# Patient Record
Sex: FEMALE | Race: BLACK OR AFRICAN AMERICAN | Employment: FULL TIME | URBAN - METROPOLITAN AREA
[De-identification: names, ages, dates, MRNs, and addresses within clinical notes are randomized per-mention and may not be internally consistent; named-entity substitution may affect disease eponyms.]

---
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
## 2020-01-14 ENCOUNTER — HOSPITAL ENCOUNTER (EMERGENCY)
Age: 20
Discharge: HOME | End: 2020-01-14
Attending: EMERGENCY MEDICINE
Payer: SELF-PAY

## 2020-01-14 VITALS
HEIGHT: 64 IN | HEART RATE: 63 BPM | TEMPERATURE: 98.2 F | DIASTOLIC BLOOD PRESSURE: 55 MMHG | SYSTOLIC BLOOD PRESSURE: 113 MMHG | RESPIRATION RATE: 14 BRPM | WEIGHT: 203 LBS | OXYGEN SATURATION: 99 % | BODY MASS INDEX: 34.66 KG/M2

## 2020-01-14 DIAGNOSIS — R11.2 NON-INTRACTABLE VOMITING WITH NAUSEA, UNSPECIFIED VOMITING TYPE: Primary | ICD-10-CM

## 2020-01-14 LAB
ALBUMIN SERPL-MCNC: 3.9 G/DL (ref 3.5–5)
ALBUMIN/GLOB SERPL: 0.9 {RATIO} (ref 1.2–3.5)
ALP SERPL-CCNC: 92 U/L (ref 50–136)
ALT SERPL-CCNC: 23 U/L (ref 12–65)
ANION GAP SERPL CALC-SCNC: 8 MMOL/L (ref 7–16)
AST SERPL-CCNC: 17 U/L (ref 15–37)
BASOPHILS # BLD: 0 K/UL (ref 0–0.2)
BASOPHILS NFR BLD: 0 % (ref 0–2)
BILIRUB SERPL-MCNC: 0.4 MG/DL (ref 0.2–1.1)
BUN SERPL-MCNC: 13 MG/DL (ref 6–23)
CALCIUM SERPL-MCNC: 9.4 MG/DL (ref 8.3–10.4)
CHLORIDE SERPL-SCNC: 104 MMOL/L (ref 98–107)
CO2 SERPL-SCNC: 26 MMOL/L (ref 21–32)
CREAT SERPL-MCNC: 0.66 MG/DL (ref 0.6–1)
DIFFERENTIAL METHOD BLD: ABNORMAL
EOSINOPHIL # BLD: 0.1 K/UL (ref 0–0.8)
EOSINOPHIL NFR BLD: 1 % (ref 0.5–7.8)
ERYTHROCYTE [DISTWIDTH] IN BLOOD BY AUTOMATED COUNT: 12.7 % (ref 11.9–14.6)
GLOBULIN SER CALC-MCNC: 4.2 G/DL (ref 2.3–3.5)
GLUCOSE SERPL-MCNC: 91 MG/DL (ref 65–100)
HCG UR QL: NEGATIVE
HCT VFR BLD AUTO: 40.8 % (ref 35.8–46.3)
HGB BLD-MCNC: 13.8 G/DL (ref 11.7–15.4)
IMM GRANULOCYTES # BLD AUTO: 0 K/UL (ref 0–0.5)
IMM GRANULOCYTES NFR BLD AUTO: 0 % (ref 0–5)
LYMPHOCYTES # BLD: 1.3 K/UL (ref 0.5–4.6)
LYMPHOCYTES NFR BLD: 13 % (ref 13–44)
MCH RBC QN AUTO: 29.2 PG (ref 26.1–32.9)
MCHC RBC AUTO-ENTMCNC: 33.8 G/DL (ref 31.4–35)
MCV RBC AUTO: 86.4 FL (ref 79.6–97.8)
MONOCYTES # BLD: 0.6 K/UL (ref 0.1–1.3)
MONOCYTES NFR BLD: 6 % (ref 4–12)
NEUTS SEG # BLD: 8.1 K/UL (ref 1.7–8.2)
NEUTS SEG NFR BLD: 80 % (ref 43–78)
NRBC # BLD: 0 K/UL (ref 0–0.2)
PLATELET # BLD AUTO: 184 K/UL (ref 150–450)
PMV BLD AUTO: 12.1 FL (ref 9.4–12.3)
POTASSIUM SERPL-SCNC: 3.9 MMOL/L (ref 3.5–5.1)
PROT SERPL-MCNC: 8.1 G/DL (ref 6.3–8.2)
RBC # BLD AUTO: 4.72 M/UL (ref 4.05–5.2)
SODIUM SERPL-SCNC: 138 MMOL/L (ref 136–145)
WBC # BLD AUTO: 10.1 K/UL (ref 4.3–11.1)

## 2020-01-14 PROCEDURE — 74011250636 HC RX REV CODE- 250/636: Performed by: EMERGENCY MEDICINE

## 2020-01-14 PROCEDURE — 81003 URINALYSIS AUTO W/O SCOPE: CPT | Performed by: EMERGENCY MEDICINE

## 2020-01-14 PROCEDURE — 80053 COMPREHEN METABOLIC PANEL: CPT

## 2020-01-14 PROCEDURE — 81025 URINE PREGNANCY TEST: CPT

## 2020-01-14 PROCEDURE — 99284 EMERGENCY DEPT VISIT MOD MDM: CPT | Performed by: EMERGENCY MEDICINE

## 2020-01-14 PROCEDURE — 96374 THER/PROPH/DIAG INJ IV PUSH: CPT | Performed by: EMERGENCY MEDICINE

## 2020-01-14 PROCEDURE — 96361 HYDRATE IV INFUSION ADD-ON: CPT | Performed by: EMERGENCY MEDICINE

## 2020-01-14 PROCEDURE — 85025 COMPLETE CBC W/AUTO DIFF WBC: CPT

## 2020-01-14 RX ORDER — ONDANSETRON 2 MG/ML
4 INJECTION INTRAMUSCULAR; INTRAVENOUS
Status: COMPLETED | OUTPATIENT
Start: 2020-01-14 | End: 2020-01-14

## 2020-01-14 RX ORDER — ONDANSETRON 4 MG/1
4 TABLET, ORALLY DISINTEGRATING ORAL
Qty: 11 TAB | Refills: 1 | Status: SHIPPED | OUTPATIENT
Start: 2020-01-14 | End: 2020-07-27

## 2020-01-14 RX ADMIN — ONDANSETRON 4 MG: 2 INJECTION INTRAMUSCULAR; INTRAVENOUS at 12:35

## 2020-01-14 RX ADMIN — SODIUM CHLORIDE 1000 ML: 900 INJECTION, SOLUTION INTRAVENOUS at 12:35

## 2020-01-14 NOTE — ED NOTES
I have reviewed discharge instructions with the patient. The patient verbalized understanding. Patient left ED via Discharge Method: ambulatory to Home. Opportunity for questions and clarification provided. Patient given 1 scripts. To continue your aftercare when you leave the hospital, you may receive an automated call from our care team to check in on how you are doing. This is a free service and part of our promise to provide the best care and service to meet your aftercare needs.  If you have questions, or wish to unsubscribe from this service please call [phone]. Thank you for Choosing our [facility] Emergency Department.

## 2020-01-14 NOTE — ED TRIAGE NOTES
Pt to ED c/o irregular menstrual cycle. Pt hadn't had a period in 5 months and just started spotting yesterday. States she then became very nauseated and started vomiting. Denies menstrual cramps but states abdomen is sore from vomiting. No fever.

## 2020-01-14 NOTE — ED PROVIDER NOTES
Patient is a 45-year-old female with a history of irregular menstrual cycles who comes to the ER today complaining of abdominal pain and vaginal spotting which started yesterday. She also had some nausea. This morning she started vomiting. No fever. No diarrhea. Not had a cycle in 5 months but this is not abnormal for her. She denies any urinary symptoms. She denies any vaginal discharge. The history is provided by the patient. Menstrual Problem   Primary symptoms include vaginal bleeding. Primary symptoms include no discharge, no genital odor, no dysuria. There has been no fever. The current episode started yesterday. The problem has not changed since onset. Her LMP is unknown. Associated symptoms include abdominal pain, nausea and vomiting. Pertinent negatives include no diarrhea, no frequency, no flank pain and no fever. She has tried nothing for the symptoms. Vomiting    Associated symptoms include abdominal pain. Pertinent negatives include no chills, no fever, no diarrhea and no cough. Past Medical History:   Diagnosis Date    Asthma        No past surgical history on file. No family history on file.     Social History     Socioeconomic History    Marital status: SINGLE     Spouse name: Not on file    Number of children: Not on file    Years of education: Not on file    Highest education level: Not on file   Occupational History    Not on file   Social Needs    Financial resource strain: Not on file    Food insecurity:     Worry: Not on file     Inability: Not on file    Transportation needs:     Medical: Not on file     Non-medical: Not on file   Tobacco Use    Smoking status: Never Smoker    Smokeless tobacco: Never Used   Substance and Sexual Activity    Alcohol use: No    Drug use: No    Sexual activity: Not Currently   Lifestyle    Physical activity:     Days per week: Not on file     Minutes per session: Not on file    Stress: Not on file   Relationships    Social connections:     Talks on phone: Not on file     Gets together: Not on file     Attends Scientology service: Not on file     Active member of club or organization: Not on file     Attends meetings of clubs or organizations: Not on file     Relationship status: Not on file    Intimate partner violence:     Fear of current or ex partner: Not on file     Emotionally abused: Not on file     Physically abused: Not on file     Forced sexual activity: Not on file   Other Topics Concern    Not on file   Social History Narrative    Not on file         ALLERGIES: Patient has no known allergies. Review of Systems   Constitutional: Negative for chills, fatigue and fever. HENT: Negative for congestion, rhinorrhea and sore throat. Eyes: Negative for pain, discharge and visual disturbance. Respiratory: Negative for cough and shortness of breath. Cardiovascular: Negative for chest pain and palpitations. Gastrointestinal: Positive for abdominal pain, nausea and vomiting. Negative for diarrhea. Endocrine: Negative for polydipsia and polyuria. Genitourinary: Positive for menstrual problem and vaginal bleeding. Negative for dysuria, flank pain, frequency and urgency. Musculoskeletal: Negative for back pain and neck pain. Skin: Negative for rash. Neurological: Negative for seizures, syncope and weakness. Hematological: Negative. Vitals:    01/14/20 1039   BP: 119/67   Pulse: 67   Resp: 14   Temp: 98.2 °F (36.8 °C)   SpO2: 98%   Weight: 92.1 kg (203 lb)   Height: 5' 4\" (1.626 m)            Physical Exam  Vitals signs and nursing note reviewed. Constitutional:       Appearance: Normal appearance. She is well-developed. HENT:      Head: Normocephalic and atraumatic. Eyes:      Conjunctiva/sclera: Conjunctivae normal.      Pupils: Pupils are equal, round, and reactive to light. Neck:      Musculoskeletal: Normal range of motion and neck supple.    Cardiovascular:      Rate and Rhythm: Normal rate and regular rhythm. Pulmonary:      Effort: Pulmonary effort is normal.      Breath sounds: Normal breath sounds. Abdominal:      Palpations: Abdomen is soft. Tenderness: There is no tenderness. There is no guarding or rebound. Musculoskeletal: Normal range of motion. General: No deformity. Lymphadenopathy:      Cervical: No cervical adenopathy. Skin:     General: Skin is warm and dry. Findings: No rash. Neurological:      Mental Status: She is alert and oriented to person, place, and time. GCS: GCS eye subscore is 4. GCS verbal subscore is 5. GCS motor subscore is 6. Cranial Nerves: No cranial nerve deficit. Sensory: No sensory deficit. MDM  Number of Diagnoses or Management Options  Diagnosis management comments: Urinalysis is clean, hCG negative    Abdominal exam is benign. Will check basic screening labs. Give IV fluids. Zofran for the nausea. 1:04 PM  Laboratory evaluation normal    Clinically I think this is likely a viral illness causing the nausea and vomiting. I will prescribe some Zofran for home. She also needs a work note. Voice dictation software was used during the making of this note. This software is not perfect and grammatical and other typographical errors may be present. This note has been proofread, but may still contain errors.   Carlo Velez MD; 1/14/2020 @1:05 PM   ===================================================================         Amount and/or Complexity of Data Reviewed  Clinical lab tests: ordered and reviewed  Independent visualization of images, tracings, or specimens: yes    Risk of Complications, Morbidity, and/or Mortality  Presenting problems: low  Diagnostic procedures: low  Management options: low    Patient Progress  Patient progress: improved         Procedures

## 2020-01-14 NOTE — LETTER
[address] [address] EMERGENCY DEPT 
[address] 
[place] [address] 
[phone] Work/School Note Date: 1/14/2020 To Whom It May concern: 
 
Crystal Morin was seen and treated today in the emergency room by the following provider(s): 
Attending Provider: Judith Husain, [facility] {Return to school/sport/work:1/15/2020 Sincerely, Karla Rojas MD

## 2020-07-27 ENCOUNTER — HOSPITAL ENCOUNTER (EMERGENCY)
Age: 20
Discharge: HOME | End: 2020-07-27
Attending: EMERGENCY MEDICINE

## 2020-07-27 ENCOUNTER — APPOINTMENT (OUTPATIENT)
Dept: GENERAL RADIOLOGY | Age: 20
End: 2020-07-27
Attending: EMERGENCY MEDICINE

## 2020-07-27 ENCOUNTER — APPOINTMENT (OUTPATIENT)
Dept: CT IMAGING | Age: 20
End: 2020-07-27
Attending: EMERGENCY MEDICINE

## 2020-07-27 VITALS
TEMPERATURE: 98.1 F | HEART RATE: 108 BPM | WEIGHT: 202.82 LBS | SYSTOLIC BLOOD PRESSURE: 117 MMHG | OXYGEN SATURATION: 99 % | BODY MASS INDEX: 34.63 KG/M2 | RESPIRATION RATE: 20 BRPM | HEIGHT: 64 IN | DIASTOLIC BLOOD PRESSURE: 75 MMHG

## 2020-07-27 DIAGNOSIS — S30.1XXA CONTUSION OF ABDOMINAL WALL, INITIAL ENCOUNTER: ICD-10-CM

## 2020-07-27 DIAGNOSIS — S16.1XXA STRAIN OF NECK MUSCLE, INITIAL ENCOUNTER: Primary | ICD-10-CM

## 2020-07-27 LAB
ANION GAP SERPL CALC-SCNC: 8 MMOL/L (ref 7–16)
BASOPHILS # BLD: 0 K/UL (ref 0–0.2)
BASOPHILS NFR BLD: 0 % (ref 0–2)
BUN SERPL-MCNC: 19 MG/DL (ref 6–23)
CALCIUM SERPL-MCNC: 9.2 MG/DL (ref 8.3–10.4)
CHLORIDE SERPL-SCNC: 112 MMOL/L (ref 98–107)
CO2 SERPL-SCNC: 23 MMOL/L (ref 21–32)
CREAT SERPL-MCNC: 0.9 MG/DL (ref 0.6–1)
DIFFERENTIAL METHOD BLD: NORMAL
EOSINOPHIL # BLD: 0.1 K/UL (ref 0–0.8)
EOSINOPHIL NFR BLD: 1 % (ref 0.5–7.8)
ERYTHROCYTE [DISTWIDTH] IN BLOOD BY AUTOMATED COUNT: 13.2 % (ref 11.9–14.6)
GLUCOSE SERPL-MCNC: 101 MG/DL (ref 65–100)
HCG UR QL: NEGATIVE
HCT VFR BLD AUTO: 39.6 % (ref 35.8–46.3)
HGB BLD-MCNC: 13.6 G/DL (ref 11.7–15.4)
IMM GRANULOCYTES # BLD AUTO: 0 K/UL (ref 0–0.5)
IMM GRANULOCYTES NFR BLD AUTO: 0 % (ref 0–5)
LYMPHOCYTES # BLD: 1.7 K/UL (ref 0.5–4.6)
LYMPHOCYTES NFR BLD: 16 % (ref 13–44)
MCH RBC QN AUTO: 28.9 PG (ref 26.1–32.9)
MCHC RBC AUTO-ENTMCNC: 34.3 G/DL (ref 31.4–35)
MCV RBC AUTO: 84.3 FL (ref 79.6–97.8)
MONOCYTES # BLD: 0.5 K/UL (ref 0.1–1.3)
MONOCYTES NFR BLD: 5 % (ref 4–12)
NEUTS SEG # BLD: 7.9 K/UL (ref 1.7–8.2)
NEUTS SEG NFR BLD: 78 % (ref 43–78)
NRBC # BLD: 0 K/UL (ref 0–0.2)
PLATELET # BLD AUTO: 210 K/UL (ref 150–450)
PMV BLD AUTO: 11.7 FL (ref 9.4–12.3)
POTASSIUM SERPL-SCNC: 3.7 MMOL/L (ref 3.5–5.1)
RBC # BLD AUTO: 4.7 M/UL (ref 4.05–5.2)
SODIUM SERPL-SCNC: 143 MMOL/L (ref 136–145)
WBC # BLD AUTO: 10.1 K/UL (ref 4.3–11.1)

## 2020-07-27 PROCEDURE — 74011000258 HC RX REV CODE- 258: Performed by: EMERGENCY MEDICINE

## 2020-07-27 PROCEDURE — 96374 THER/PROPH/DIAG INJ IV PUSH: CPT

## 2020-07-27 PROCEDURE — 96375 TX/PRO/DX INJ NEW DRUG ADDON: CPT

## 2020-07-27 PROCEDURE — 80048 BASIC METABOLIC PNL TOTAL CA: CPT

## 2020-07-27 PROCEDURE — 74177 CT ABD & PELVIS W/CONTRAST: CPT

## 2020-07-27 PROCEDURE — 74011636320 HC RX REV CODE- 636/320: Performed by: EMERGENCY MEDICINE

## 2020-07-27 PROCEDURE — 81025 URINE PREGNANCY TEST: CPT

## 2020-07-27 PROCEDURE — 99285 EMERGENCY DEPT VISIT HI MDM: CPT

## 2020-07-27 PROCEDURE — 85025 COMPLETE CBC W/AUTO DIFF WBC: CPT

## 2020-07-27 PROCEDURE — 72125 CT NECK SPINE W/O DYE: CPT

## 2020-07-27 PROCEDURE — 74011250636 HC RX REV CODE- 250/636: Performed by: EMERGENCY MEDICINE

## 2020-07-27 PROCEDURE — 81003 URINALYSIS AUTO W/O SCOPE: CPT

## 2020-07-27 PROCEDURE — 71045 X-RAY EXAM CHEST 1 VIEW: CPT

## 2020-07-27 RX ORDER — DICLOFENAC SODIUM 75 MG/1
75 TABLET, DELAYED RELEASE ORAL 2 TIMES DAILY
Qty: 10 TAB | Refills: 0 | Status: SHIPPED | OUTPATIENT
Start: 2020-07-27 | End: 2021-01-18

## 2020-07-27 RX ORDER — CYCLOBENZAPRINE HCL 10 MG
10 TABLET ORAL 2 TIMES DAILY
Qty: 8 TAB | Refills: 0 | Status: SHIPPED | OUTPATIENT
Start: 2020-07-27 | End: 2021-01-18

## 2020-07-27 RX ORDER — SODIUM CHLORIDE 0.9 % (FLUSH) 0.9 %
10 SYRINGE (ML) INJECTION
Status: COMPLETED | OUTPATIENT
Start: 2020-07-27 | End: 2020-07-27

## 2020-07-27 RX ORDER — MORPHINE SULFATE 4 MG/ML
4 INJECTION INTRAVENOUS
Status: COMPLETED | OUTPATIENT
Start: 2020-07-27 | End: 2020-07-27

## 2020-07-27 RX ORDER — ONDANSETRON 2 MG/ML
4 INJECTION INTRAMUSCULAR; INTRAVENOUS
Status: COMPLETED | OUTPATIENT
Start: 2020-07-27 | End: 2020-07-27

## 2020-07-27 RX ADMIN — ONDANSETRON 4 MG: 2 INJECTION INTRAMUSCULAR; INTRAVENOUS at 03:40

## 2020-07-27 RX ADMIN — SODIUM CHLORIDE 100 ML: 900 INJECTION, SOLUTION INTRAVENOUS at 04:10

## 2020-07-27 RX ADMIN — SODIUM CHLORIDE 1000 ML: 900 INJECTION, SOLUTION INTRAVENOUS at 03:40

## 2020-07-27 RX ADMIN — IOPAMIDOL 100 ML: 755 INJECTION, SOLUTION INTRAVENOUS at 04:10

## 2020-07-27 RX ADMIN — Medication 10 ML: at 04:10

## 2020-07-27 RX ADMIN — MORPHINE SULFATE 4 MG: 4 INJECTION INTRAVENOUS at 03:40

## 2020-07-27 NOTE — ED NOTES
Assisted in transfers with pt in CT. Pt back in room on bp and ow monitors. Mother at bedside with md permission.

## 2020-07-27 NOTE — DISCHARGE INSTRUCTIONS
Patient Education        Neck Strain: Care Instructions  Your Care Instructions     You have strained the muscles and ligaments in your neck. A sudden, awkward movement can strain the neck. This often occurs with falls or car accidents or during certain sports. Everyday activities like working on a computer or sleeping can also cause neck strain if they force you to hold your neck in an awkward position for a long time. It is common for neck pain to get worse for a day or two after an injury, but it should start to feel better after that. You may have more pain and stiffness for several days before it gets better. This is expected. It may take a few weeks or longer for it to heal completely. Good home treatment can help you get better faster and avoid future neck problems. Follow-up care is a key part of your treatment and safety. Be sure to make and go to all appointments, and call your doctor if you are having problems. It's also a good idea to know your test results and keep a list of the medicines you take. How can you care for yourself at home? · If you were given a neck brace (cervical collar) to limit neck motion, wear it as instructed for as many days as your doctor tells you to. Do not wear it longer than you were told to. Wearing a brace for too long can make neck stiffness worse and weaken the neck muscles. · You can try using heat or ice to see if it helps. ? Try using a heating pad on a low or medium setting for 15 to 20 minutes every 2 to 3 hours. Try a warm shower in place of one session with the heating pad. You can also buy single-use heat wraps that last up to 8 hours. ? You can also try an ice pack for 10 to 15 minutes every 2 to 3 hours. · Take pain medicines exactly as directed. ? If the doctor gave you a prescription medicine for pain, take it as prescribed. ? If you are not taking a prescription pain medicine, ask your doctor if you can take an over-the-counter medicine.   · Gently rub the area to relieve pain and help with blood flow. Do not massage the area if it hurts to do so. · Do not do anything that makes the pain worse. Take it easy for a couple of days. You can do your usual activities if they do not hurt your neck or put it at risk for more stress or injury. · Try sleeping on a special neck pillow. Place it under your neck, not under your head. Placing a tightly rolled-up towel under your neck while you sleep will also work. If you use a neck pillow or rolled towel, do not use your regular pillow at the same time. · To prevent future neck pain, do exercises to stretch and strengthen your neck and back. Learn how to use good posture, safe lifting techniques, and proper body mechanics. When should you call for help? [id number] anytime you think you may need emergency care. For example, call if:  · You are unable to move an arm or a leg at all. Call your doctor now or seek immediate medical care if:  · You have new or worse symptoms in your arms, legs, chest, belly, or buttocks. Symptoms may include:  ? Numbness or tingling. ? Weakness. ? Pain. · You lose bladder or bowel control. Watch closely for changes in your health, and be sure to contact your doctor if:  · You are not getting better as expected. Where can you learn more? Go to http://mariana-babs.info/  Enter M253 in the search box to learn more about \"Neck Strain: Care Instructions. \"  Current as of: March 2, 2020               Content Version: 12.5  © 8123-6487 [employer]. Care instructions adapted under license by [employer] (which disclaims liability or warranty for this information). If you have questions about a medical condition or this instruction, always ask your healthcare professional. [place] [address] any warranty or liability for your use of this information.
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
## 2020-07-27 NOTE — ED NOTES
I have reviewed discharge instructions with the patient. The patient verbalized understanding. Patient left ED via Discharge Method: wheelchair to Home with many family members. Opportunity for questions and clarification provided. Patient given 2 scripts. To continue your aftercare when you leave the hospital, you may receive an automated call from our care team to check in on how you are doing. This is a free service and part of our promise to provide the best care and service to meet your aftercare needs.  If you have questions, or wish to unsubscribe from this service please call [phone]. Thank you for Choosing our [facility] Emergency Department.

## 2020-07-28 ENCOUNTER — APPOINTMENT (OUTPATIENT)
Dept: GENERAL RADIOLOGY | Age: 20
End: 2020-07-28
Attending: EMERGENCY MEDICINE

## 2020-07-28 ENCOUNTER — HOSPITAL ENCOUNTER (EMERGENCY)
Age: 20
Discharge: HOME | End: 2020-07-28
Attending: EMERGENCY MEDICINE

## 2020-07-28 VITALS
OXYGEN SATURATION: 98 % | DIASTOLIC BLOOD PRESSURE: 67 MMHG | BODY MASS INDEX: 33.32 KG/M2 | HEIGHT: 65 IN | WEIGHT: 200 LBS | SYSTOLIC BLOOD PRESSURE: 132 MMHG | TEMPERATURE: 98.6 F | HEART RATE: 88 BPM | RESPIRATION RATE: 16 BRPM

## 2020-07-28 DIAGNOSIS — S80.02XA CONTUSION OF LEFT KNEE, INITIAL ENCOUNTER: ICD-10-CM
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
## 2020-07-28 PROCEDURE — 74011250636 HC RX REV CODE- 250/636: Performed by: EMERGENCY MEDICINE

## 2020-07-28 PROCEDURE — 99282 EMERGENCY DEPT VISIT SF MDM: CPT

## 2020-07-28 PROCEDURE — 96372 THER/PROPH/DIAG INJ SC/IM: CPT

## 2020-07-28 PROCEDURE — 73562 X-RAY EXAM OF KNEE 3: CPT

## 2020-07-28 RX ORDER — KETOROLAC TROMETHAMINE 30 MG/ML
60 INJECTION, SOLUTION INTRAMUSCULAR; INTRAVENOUS
Status: COMPLETED | OUTPATIENT
Start: 2020-07-28 | End: 2020-07-28

## 2020-07-28 RX ADMIN — KETOROLAC TROMETHAMINE 60 MG: 30 INJECTION, SOLUTION INTRAMUSCULAR at 09:47

## 2020-07-28 NOTE — ED NOTES
I have reviewed discharge instructions with the patient. The patient verbalized understanding. Patient left ED via Discharge Method: ambulatory to Home with fiance. Opportunity for questions and clarification provided. Patient given 0 scripts. To continue your aftercare when you leave the hospital, you may receive an automated call from our care team to check in on how you are doing. This is a free service and part of our promise to provide the best care and service to meet your aftercare needs.  If you have questions, or wish to unsubscribe from this service please call [phone]. Thank you for Choosing our [facility] Emergency Department.

## 2020-07-28 NOTE — DISCHARGE INSTRUCTIONS
Patient Education        Hematoma: Care Instructions  Your Care Instructions     A hematoma is a bad bruise. It happens when an injury causes blood to collect and pool under the skin. The pooling blood gives the skin a spongy, rubbery, lumpy feel. A hematoma usually is not a cause for concern. It is not the same thing as a blood clot in a vein, and it does not cause blood clots. Follow-up care is a key part of your treatment and safety. Be sure to make and go to all appointments, and call your doctor if you are having problems. It's also a good idea to know your test results and keep a list of the medicines you take. How can you care for yourself at home? · Rest and protect the bruised area. · Put ice or a cold pack on the area for 10 to 20 minutes at a time. · Prop up the bruised area on a pillow when you ice it or anytime you sit or lie down during the next 3 days. Try to keep it above the level of your heart. This will help reduce swelling. · Wrapping the bruised area with an elastic bandage such as an Ace wrap will help decrease swelling. Don't wrap it too tightly, as this can cause more swelling below the affected area. · Be safe with medicines. Read and follow all instructions on the label. ? If the doctor gave you a prescription medicine for pain, take it as prescribed. ? If you are not taking a prescription pain medicine, ask your doctor if you can take an over-the-counter medicine. · Do not take two or more pain medicines at the same time unless the doctor told you to. Many pain medicines have acetaminophen, which is Tylenol. Too much acetaminophen (Tylenol) can be harmful. When should you call for help? Call your doctor now or seek immediate medical care if:  · You have signs of skin infection, such as:  ? Increased pain, swelling, warmth, or redness. ? Red streaks leading from the area. ? Pus draining from the area. ? A fever.   Watch closely for changes in your health, and be sure to contact your doctor if:  · The bruise lasts longer than 4 weeks. · The bruise gets bigger or becomes more painful. · You do not get better as expected. Where can you learn more? Go to http://mariana-babs.info/  Enter P911 in the search box to learn more about \"Hematoma: Care Instructions. \"  Current as of: June 26, 2019               Content Version: 12.5  © 3060-0701 [employer]. Care instructions adapted under license by [employer] (which disclaims liability or warranty for this information). If you have questions about a medical condition or this instruction, always ask your healthcare professional. [address] any warranty or liability for your use of this information.

## 2020-07-28 NOTE — LETTER
[id number] [address] EMERGENCY DEPT 
[address] [facility] [id number] [phone] Work/School Note Date: 7/28/2020 To Whom It May concern: 
 
Ning Price was seen and treated today and yesterday in the emergency room by the following provider(s): 
Attending Provider: Chelsy Candelaria, [address] [facility] may return to work on 7/30/2020. Sincerely, Sonia Valadez RN
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
## 2020-07-31 ENCOUNTER — HOSPITAL ENCOUNTER (OUTPATIENT)
Dept: GENERAL RADIOLOGY | Age: 20
Discharge: HOME | End: 2020-07-31
Attending: FAMILY MEDICINE
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
## 2020-07-31 DIAGNOSIS — S80.01XA CONTUSION OF RIGHT KNEE, INITIAL ENCOUNTER: ICD-10-CM

## 2020-07-31 DIAGNOSIS — M54.50 ACUTE BILATERAL LOW BACK PAIN, UNSPECIFIED WHETHER SCIATICA PRESENT: ICD-10-CM

## 2020-08-11 ENCOUNTER — HOSPITAL ENCOUNTER (OUTPATIENT)
Dept: PHYSICAL THERAPY | Age: 20
End: 2020-08-11

## 2021-01-18 ENCOUNTER — HOSPITAL ENCOUNTER (EMERGENCY)
Age: 21
Discharge: HOME | End: 2021-01-18
Attending: STUDENT IN AN ORGANIZED HEALTH CARE EDUCATION/TRAINING PROGRAM

## 2021-01-18 ENCOUNTER — APPOINTMENT (OUTPATIENT)
Dept: GENERAL RADIOLOGY | Age: 21
End: 2021-01-18
Attending: STUDENT IN AN ORGANIZED HEALTH CARE EDUCATION/TRAINING PROGRAM

## 2021-01-18 VITALS
RESPIRATION RATE: 18 BRPM | HEIGHT: 65 IN | HEART RATE: 98 BPM | BODY MASS INDEX: 34.99 KG/M2 | OXYGEN SATURATION: 99 % | SYSTOLIC BLOOD PRESSURE: 124 MMHG | TEMPERATURE: 99 F | WEIGHT: 210 LBS | DIASTOLIC BLOOD PRESSURE: 73 MMHG

## 2021-01-18 DIAGNOSIS — Z20.822 SUSPECTED COVID-19 VIRUS INFECTION: ICD-10-CM

## 2021-01-18 DIAGNOSIS — R05.9 COUGH: Primary | ICD-10-CM

## 2021-01-18 LAB
ALBUMIN SERPL-MCNC: 4.1 G/DL (ref 3.5–5)
ALBUMIN/GLOB SERPL: 1 {RATIO} (ref 1.2–3.5)
ALP SERPL-CCNC: 97 U/L (ref 50–136)
ALT SERPL-CCNC: 24 U/L (ref 12–65)
ANION GAP SERPL CALC-SCNC: 6 MMOL/L (ref 7–16)
AST SERPL-CCNC: 9 U/L (ref 15–37)
BACTERIA URNS QL MICRO: ABNORMAL /HPF
BASOPHILS # BLD: 0 K/UL (ref 0–0.2)
BASOPHILS NFR BLD: 0 % (ref 0–2)
BILIRUB SERPL-MCNC: 0.6 MG/DL (ref 0.2–1.1)
BUN SERPL-MCNC: 9 MG/DL (ref 6–23)
CALCIUM SERPL-MCNC: 9.6 MG/DL (ref 8.3–10.4)
CASTS URNS QL MICRO: ABNORMAL /LPF
CHLORIDE SERPL-SCNC: 105 MMOL/L (ref 98–107)
CO2 SERPL-SCNC: 28 MMOL/L (ref 21–32)
CREAT SERPL-MCNC: 0.68 MG/DL (ref 0.6–1)
DIFFERENTIAL METHOD BLD: NORMAL
EOSINOPHIL # BLD: 0.2 K/UL (ref 0–0.8)
EOSINOPHIL NFR BLD: 2 % (ref 0.5–7.8)
EPI CELLS #/AREA URNS HPF: ABNORMAL /HPF
ERYTHROCYTE [DISTWIDTH] IN BLOOD BY AUTOMATED COUNT: 13.4 % (ref 11.9–14.6)
GLOBULIN SER CALC-MCNC: 4 G/DL (ref 2.3–3.5)
GLUCOSE SERPL-MCNC: 87 MG/DL (ref 65–100)
HCT VFR BLD AUTO: 41.1 % (ref 35.8–46.3)
HGB BLD-MCNC: 13.8 G/DL (ref 11.7–15.4)
IMM GRANULOCYTES # BLD AUTO: 0 K/UL (ref 0–0.5)
IMM GRANULOCYTES NFR BLD AUTO: 0 % (ref 0–5)
LYMPHOCYTES # BLD: 1.8 K/UL (ref 0.5–4.6)
LYMPHOCYTES NFR BLD: 21 % (ref 13–44)
MCH RBC QN AUTO: 28.9 PG (ref 26.1–32.9)
MCHC RBC AUTO-ENTMCNC: 33.6 G/DL (ref 31.4–35)
MCV RBC AUTO: 86 FL (ref 79.6–97.8)
MONOCYTES # BLD: 0.5 K/UL (ref 0.1–1.3)
MONOCYTES NFR BLD: 6 % (ref 4–12)
NEUTS SEG # BLD: 6 K/UL (ref 1.7–8.2)
NEUTS SEG NFR BLD: 71 % (ref 43–78)
NRBC # BLD: 0 K/UL (ref 0–0.2)
PLATELET # BLD AUTO: 206 K/UL (ref 150–450)
PMV BLD AUTO: 11.8 FL (ref 9.4–12.3)
POTASSIUM SERPL-SCNC: 3.7 MMOL/L (ref 3.5–5.1)
PROT SERPL-MCNC: 8.1 G/DL (ref 6.3–8.2)
RBC # BLD AUTO: 4.78 M/UL (ref 4.05–5.2)
RBC #/AREA URNS HPF: ABNORMAL /HPF
SODIUM SERPL-SCNC: 139 MMOL/L (ref 136–145)
WBC # BLD AUTO: 8.4 K/UL (ref 4.3–11.1)
WBC URNS QL MICRO: ABNORMAL /HPF

## 2021-01-18 PROCEDURE — 96374 THER/PROPH/DIAG INJ IV PUSH: CPT

## 2021-01-18 PROCEDURE — 80053 COMPREHEN METABOLIC PANEL: CPT

## 2021-01-18 PROCEDURE — 85025 COMPLETE CBC W/AUTO DIFF WBC: CPT

## 2021-01-18 PROCEDURE — 87635 SARS-COV-2 COVID-19 AMP PRB: CPT

## 2021-01-18 PROCEDURE — 81003 URINALYSIS AUTO W/O SCOPE: CPT

## 2021-01-18 PROCEDURE — 99283 EMERGENCY DEPT VISIT LOW MDM: CPT

## 2021-01-18 PROCEDURE — 71046 X-RAY EXAM CHEST 2 VIEWS: CPT

## 2021-01-18 PROCEDURE — 74011250636 HC RX REV CODE- 250/636: Performed by: STUDENT IN AN ORGANIZED HEALTH CARE EDUCATION/TRAINING PROGRAM

## 2021-01-18 PROCEDURE — 81015 MICROSCOPIC EXAM OF URINE: CPT

## 2021-01-18 RX ORDER — IBUPROFEN 800 MG/1
800 TABLET ORAL
Qty: 20 TAB | Refills: 0 | Status: SHIPPED | OUTPATIENT
Start: 2021-01-18 | End: 2021-01-25

## 2021-01-18 RX ORDER — ALBUTEROL SULFATE 90 UG/1
AEROSOL, METERED RESPIRATORY (INHALATION)
COMMUNITY

## 2021-01-18 RX ORDER — PREDNISONE 20 MG/1
40 TABLET ORAL DAILY
Qty: 8 TAB | Refills: 0 | Status: SHIPPED | OUTPATIENT
Start: 2021-01-18 | End: 2021-01-22

## 2021-01-18 RX ORDER — ALBUTEROL SULFATE 90 UG/1
2 AEROSOL, METERED RESPIRATORY (INHALATION)
Qty: 1 INHALER | Refills: 4 | Status: SHIPPED | OUTPATIENT
Start: 2021-01-18

## 2021-01-18 RX ORDER — GUAIFENESIN AND DEXTROMETHORPHAN HYDROBROMIDE 1200; 60 MG/1; MG/1
1 TABLET, EXTENDED RELEASE ORAL EVERY 12 HOURS
Qty: 20 TAB | Refills: 0 | Status: SHIPPED | OUTPATIENT
Start: 2021-01-18 | End: 2021-01-23

## 2021-01-18 RX ORDER — ONDANSETRON 2 MG/ML
4 INJECTION INTRAMUSCULAR; INTRAVENOUS
Status: COMPLETED | OUTPATIENT
Start: 2021-01-18 | End: 2021-01-18

## 2021-01-18 RX ORDER — ONDANSETRON 4 MG/1
4 TABLET, ORALLY DISINTEGRATING ORAL
Qty: 8 TAB | Refills: 2 | Status: SHIPPED | OUTPATIENT
Start: 2021-01-18

## 2021-01-18 RX ADMIN — ONDANSETRON 4 MG: 2 INJECTION INTRAMUSCULAR; INTRAVENOUS at 16:50

## 2021-01-18 NOTE — DISCHARGE INSTRUCTIONS
Take the medication for prescribed as directed. Arrange follow-up with your primary care provider. Remain self isolate until your final Covid test has resulted. Drink plenty of clear liquids to ensure hydration and return for worsening symptoms, concerns or questions.

## 2021-01-18 NOTE — ED TRIAGE NOTES
Pt c/o nausea and cough, fatigue since last night. Has taken 2 breathing treatments. No known exposure to Covid.

## 2021-01-18 NOTE — ED NOTES
I have reviewed discharge instructions with the patient. The patient verbalized understanding. Patient left ED via Discharge Method: ambulatory to Home with self. Opportunity for questions and clarification provided. Patient given 5 scripts.

## 2021-01-18 NOTE — Clinical Note
[address] [address] EMERGENCY DEPT  [address] [id number] [phone]    Work/School Note    Date: 1/18/2021     To Whom It May concern:    Claudean Chinchilla was evaulated by the following provider(s):  Attending Provider: Lauren Arshad virus is suspected. Per the CDC guidelines we recommend home isolation until the following conditions are all met:    1. At least 10 days have passed since symptoms first appeared and  2. At least 24 hours have passed since last fever without the use of fever-reducing medications and  3.  Symptoms (e.g., cough, shortness of breath) have improved    Sincerely,          Dejon Marina,

## 2021-01-19 LAB
COVID-19 RAPID TEST, COVR: NOT DETECTED
SARS COV-2, XPGCVT: NEGATIVE
SOURCE, COVRS: NORMAL

## 2021-01-19 NOTE — ED PROVIDER NOTES
22-year-old female patient presents with reports of cough, congestion, nausea and vomiting. Patient is also experienced 1 episode of diarrhea. Denies quantified fever or chills though reports subjective feverish feeling. She reports occasional shortness of breath. History of asthma, symptoms mildly improved with albuterol inhaler at home. Patient denies known sick contacts, recent travel or known COVID-19 exposure. Reports history of similar symptoms each year about this time. Past Medical History:   Diagnosis Date    Asthma     Trauma        No past surgical history on file.       Family History:   Problem Relation Age of Onset   Buford Self Cancer Mother     Cancer Other        Social History     Socioeconomic History    Marital status: SINGLE     Spouse name: Not on file    Number of children: Not on file    Years of education: Not on file    Highest education level: Not on file   Occupational History    Not on file   Social Needs    Financial resource strain: Not on file    Food insecurity     Worry: Not on file     Inability: Not on file    Transportation needs     Medical: Not on file     Non-medical: Not on file   Tobacco Use    Smoking status: Never Smoker    Smokeless tobacco: Never Used   Substance and Sexual Activity    Alcohol use: No    Drug use: No    Sexual activity: Not Currently   Lifestyle    Physical activity     Days per week: Not on file     Minutes per session: Not on file    Stress: Not on file   Relationships    Social connections     Talks on phone: Not on file     Gets together: Not on file     Attends Zoroastrian service: Not on file     Active member of club or organization: Not on file     Attends meetings of clubs or organizations: Not on file     Relationship status: Not on file    Intimate partner violence     Fear of current or ex partner: Not on file     Emotionally abused: Not on file     Physically abused: Not on file     Forced sexual activity: Not on file Other Topics Concern    Not on file   Social History Narrative    Not on file         ALLERGIES: Patient has no known allergies. Review of Systems   Constitutional: Negative for chills, diaphoresis and fever. HENT: Negative for congestion, sneezing and sore throat. Eyes: Negative for visual disturbance. Respiratory: Positive for cough and shortness of breath. Negative for chest tightness and wheezing. Cardiovascular: Negative for chest pain and leg swelling. Gastrointestinal: Positive for diarrhea, nausea and vomiting. Negative for abdominal pain and blood in stool. Endocrine: Negative for polyuria. Genitourinary: Negative for difficulty urinating, dysuria, flank pain, hematuria and urgency. Musculoskeletal: Negative for back pain, myalgias, neck pain and neck stiffness. Skin: Negative for color change and rash. Neurological: Negative for dizziness, syncope, speech difficulty, weakness, light-headedness, numbness and headaches. Psychiatric/Behavioral: Negative for behavioral problems. All other systems reviewed and are negative. Vitals:    01/18/21 1321   BP: 124/73   Pulse: 98   Resp: 18   Temp: 99 °F (37.2 °C)   SpO2: 99%   Weight: 95.3 kg (210 lb)   Height: 5' 5\" (1.651 m)            Physical Exam  Vitals signs and nursing note reviewed. Constitutional:       General: She is not in acute distress. Appearance: She is well-developed. She is not diaphoretic. Comments: Alert and oriented to person place and time. No acute distress, speaks in clear, fluid sentences. HENT:      Head: Normocephalic and atraumatic. Right Ear: External ear normal.      Left Ear: External ear normal.      Nose: Nose normal.   Eyes:      Pupils: Pupils are equal, round, and reactive to light. Neck:      Musculoskeletal: Normal range of motion. Cardiovascular:      Rate and Rhythm: Normal rate and regular rhythm. Heart sounds: Normal heart sounds. No murmur. No friction rub. No gallop. Pulmonary:      Effort: Pulmonary effort is normal. No respiratory distress. Breath sounds: Normal breath sounds. No stridor. No decreased breath sounds, wheezing, rhonchi or rales. Chest:      Chest wall: No tenderness. Abdominal:      General: There is no distension. Palpations: Abdomen is soft. There is no mass. Tenderness: There is no abdominal tenderness. There is no guarding or rebound. Hernia: No hernia is present. Musculoskeletal: Normal range of motion. General: No tenderness or deformity. Skin:     General: Skin is warm and dry. Neurological:      Mental Status: She is alert and oriented to person, place, and time. Cranial Nerves: No cranial nerve deficit. MDM  Number of Diagnoses or Management Options  Cough: new and does not require workup  Suspected COVID-19 virus infection: new and does not require workup  Diagnosis management comments: Rapid Covid swab was negative, labs are unremarkable. Urinalysis does show some bacteria though significant epithelial cells are present. Patient denies symptoms associated with UTI. Her chest x-ray is clear and her vitals are stable. Advised that she remain self isolate until PCR Covid test comes back. Will provide meds for symptomatic treatment and work note. Patient advised to return if symptoms worsen. ,Voice dictation software was used during the making of this note. This software is not perfect and grammatical and other typographical errors may be present. This note has been proofread, but may still contain errors.   [address] Billy Carvajal [employer]; 1/18/2021 @11:39 PM   ===================================================================         Amount and/or Complexity of Data Reviewed  Clinical lab tests: ordered and reviewed  Tests in the radiology section of CPT®: reviewed and ordered  Tests in the medicine section of CPT®: ordered and reviewed  Independent visualization of images, tracings, or specimens: yes    Risk of Complications, Morbidity, and/or Mortality  Presenting problems: moderate  Diagnostic procedures: low  Management options: moderate    Patient Progress  Patient progress: stable    ED Course as of Jan 18 2334   Mon Jan 18, 2021   1738 XR CHEST PA LAT [BR]      ED Course User Index  [BR] Debra Kirby DO       Procedures

## 2021-02-12 ENCOUNTER — HOSPITAL ENCOUNTER (EMERGENCY)
Age: 21
Discharge: HOME | End: 2021-02-12

## 2021-02-12 VITALS
HEART RATE: 72 BPM | BODY MASS INDEX: 32.65 KG/M2 | OXYGEN SATURATION: 97 % | WEIGHT: 196 LBS | TEMPERATURE: 98.9 F | HEIGHT: 65 IN | DIASTOLIC BLOOD PRESSURE: 67 MMHG | SYSTOLIC BLOOD PRESSURE: 115 MMHG | RESPIRATION RATE: 18 BRPM

## 2021-02-12 DIAGNOSIS — Z20.2 POSSIBLE EXPOSURE TO STD: Primary | ICD-10-CM

## 2021-02-12 PROCEDURE — 99283 EMERGENCY DEPT VISIT LOW MDM: CPT

## 2021-02-12 RX ORDER — DOXYCYCLINE 100 MG/1
100 CAPSULE ORAL
Status: DISCONTINUED | OUTPATIENT
Start: 2021-02-12 | End: 2021-02-12

## 2021-02-12 NOTE — ED NOTES
Verbal order at this time from Dr. Mar Saldana to cancel GC/clamydia, rocephin and doxycycline. Orders repeated and confirmed.

## 2021-02-12 NOTE — ED TRIAGE NOTES
Patient advises she was told she was exposed to Herpes about 2 weeks ago with female partner, denies any complaints. Mask on during triage.

## 2021-02-12 NOTE — ED PROVIDER NOTES
80-year-old female complaining of possible exposure to STD. Patient states that her girlfriend was diagnosed with herpes a blood test at the OB/GYN office that had intermittent intercourse however the patient did not have an outbreak at the time. This patient does not have any symptoms. She states she was checked for [facility] and chlamydia and it was negative she wants to be checked for herpes. The history is provided by the patient. Exposure to STD  The incident occurred more than 2 days ago. The sexual encounter was with a regular sexual partner. Pertinent negatives include no vaginal pain, no vaginal discharge, no vaginal bleeding, no anal discharge and no anal bleeding. There has been no physical assault. She has tried nothing for the symptoms. Past Medical History:   Diagnosis Date    Asthma     Trauma        History reviewed. No pertinent surgical history.       Family History:   Problem Relation Age of Onset   Dorsey Sicard Cancer Mother     Cancer Other        Social History     Socioeconomic History    Marital status: SINGLE     Spouse name: Not on file    Number of children: Not on file    Years of education: Not on file    Highest education level: Not on file   Occupational History    Not on file   Social Needs    Financial resource strain: Not on file    Food insecurity     Worry: Not on file     Inability: Not on file    Transportation needs     Medical: Not on file     Non-medical: Not on file   Tobacco Use    Smoking status: Never Smoker    Smokeless tobacco: Never Used   Substance and Sexual Activity    Alcohol use: No    Drug use: No    Sexual activity: Not Currently   Lifestyle    Physical activity     Days per week: Not on file     Minutes per session: Not on file    Stress: Not on file   Relationships    Social connections     Talks on phone: Not on file     Gets together: Not on file     Attends Hoahaoism service: Not on file     Active member of club or organization: Not on file Attends meetings of clubs or organizations: Not on file     Relationship status: Not on file    Intimate partner violence     Fear of current or ex partner: Not on file     Emotionally abused: Not on file     Physically abused: Not on file     Forced sexual activity: Not on file   Other Topics Concern    Not on file   Social History Narrative    Not on file         ALLERGIES: Patient has no known allergies. Review of Systems   Constitutional: Negative. Negative for activity change. HENT: Negative. Eyes: Negative. Respiratory: Negative. Cardiovascular: Negative. Gastrointestinal: Negative. Negative for anal bleeding. Genitourinary: Negative. Negative for vaginal bleeding, vaginal discharge and vaginal pain. Musculoskeletal: Negative. Skin: Negative. Neurological: Negative. Psychiatric/Behavioral: Negative. All other systems reviewed and are negative. Vitals:    02/12/21 1247   BP: 127/70   Pulse: 96   Resp: 16   Temp: 98.9 °F (37.2 °C)   SpO2: 97%   Weight: 88.9 kg (196 lb)   Height: 5' 5\" (1.651 m)            Physical Exam  Vitals signs and nursing note reviewed. Constitutional:       General: She is not in acute distress. Appearance: She is well-developed. HENT:      Head: Normocephalic and atraumatic. Right Ear: External ear normal.      Left Ear: External ear normal.      Nose: Nose normal.   Eyes:      General: No scleral icterus. Right eye: No discharge. Left eye: No discharge. Conjunctiva/sclera: Conjunctivae normal.      Pupils: Pupils are equal, round, and reactive to light. Neck:      Musculoskeletal: Normal range of motion. Cardiovascular:      Rate and Rhythm: Regular rhythm. Pulmonary:      Effort: Pulmonary effort is normal. No respiratory distress. Breath sounds: Normal breath sounds. No stridor. No wheezing or rales. Abdominal:      General: Bowel sounds are normal. There is no distension.       Palpations: Abdomen is soft. Tenderness: There is no abdominal tenderness. Musculoskeletal: Normal range of motion. Skin:     General: Skin is warm and dry. Findings: No rash. Neurological:      Mental Status: She is alert and oriented to person, place, and time. Motor: No abnormal muscle tone. Coordination: Coordination normal.   Psychiatric:         Behavior: Behavior normal.          MDM  Number of Diagnoses or Management Options  Diagnosis management comments: Patient is wanting tested for herpes however she is not having any rash or signs of herpes at this time. Her exposure was unknown. Due to the ongoing pandemic and ER volume she is advised to go to her OB/GYN for further testing. Patient is agreeable to this and does not want to wait any further for a room in the ED.     Risk of Complications, Morbidity, and/or Mortality  Presenting problems: low  Diagnostic procedures: low  Management options: low    Patient Progress  Patient progress: stable         Procedures

## 2021-10-10 ENCOUNTER — HOSPITAL ENCOUNTER (OUTPATIENT)
Dept: LAB | Age: 21
Discharge: HOME | End: 2021-10-10

## 2021-10-10 LAB
ALBUMIN SERPL-MCNC: 3.8 G/DL (ref 3.5–5)
ALBUMIN/GLOB SERPL: 1 {RATIO} (ref 1.2–3.5)
ALP SERPL-CCNC: 77 U/L (ref 50–130)
ALT SERPL-CCNC: 18 U/L (ref 12–65)
ANION GAP SERPL CALC-SCNC: 8 MMOL/L (ref 7–16)
APTT PPP: 29.1 SEC (ref 24.1–35.1)
AST SERPL-CCNC: 11 U/L (ref 15–37)
BASOPHILS # BLD: 0 K/UL (ref 0–0.2)
BASOPHILS NFR BLD: 0 % (ref 0–2)
BILIRUB SERPL-MCNC: 0.3 MG/DL (ref 0.2–1.1)
BUN SERPL-MCNC: 9 MG/DL (ref 6–23)
CALCIUM SERPL-MCNC: 9.2 MG/DL (ref 8.3–10.4)
CHLORIDE SERPL-SCNC: 106 MMOL/L (ref 98–107)
CO2 SERPL-SCNC: 25 MMOL/L (ref 21–32)
CREAT SERPL-MCNC: 0.63 MG/DL (ref 0.6–1)
DIFFERENTIAL METHOD BLD: NORMAL
EOSINOPHIL # BLD: 0.1 K/UL (ref 0–0.8)
EOSINOPHIL NFR BLD: 3 % (ref 0.5–7.8)
ERYTHROCYTE [DISTWIDTH] IN BLOOD BY AUTOMATED COUNT: 12.7 % (ref 11.9–14.6)
GLOBULIN SER CALC-MCNC: 3.8 G/DL (ref 2.3–3.5)
GLUCOSE SERPL-MCNC: 97 MG/DL (ref 65–100)
HCG SERPL QL: NEGATIVE
HCT VFR BLD AUTO: 38.7 % (ref 35.8–46.3)
HGB BLD-MCNC: 13 G/DL (ref 11.7–15.4)
HIV 1+2 AB+HIV1 P24 AG SERPL QL IA: NONREACTIVE
HIV12 RESULT COMMENT, HHIVC: ABNORMAL
IMM GRANULOCYTES # BLD AUTO: 0 K/UL (ref 0–0.5)
IMM GRANULOCYTES NFR BLD AUTO: 0 % (ref 0–5)
INR PPP: 0.9
LYMPHOCYTES # BLD: 1.8 K/UL (ref 0.5–4.6)
LYMPHOCYTES NFR BLD: 35 % (ref 13–44)
MCH RBC QN AUTO: 29.3 PG (ref 26.1–32.9)
MCHC RBC AUTO-ENTMCNC: 33.6 G/DL (ref 31.4–35)
MCV RBC AUTO: 87.4 FL (ref 79.6–97.8)
MONOCYTES # BLD: 0.4 K/UL (ref 0.1–1.3)
MONOCYTES NFR BLD: 7 % (ref 4–12)
NEUTS SEG # BLD: 2.8 K/UL (ref 1.7–8.2)
NEUTS SEG NFR BLD: 54 % (ref 43–78)
NRBC # BLD: 0 K/UL (ref 0–0.2)
PLATELET # BLD AUTO: 179 K/UL (ref 150–450)
PMV BLD AUTO: 11.9 FL (ref 9.4–12.3)
POTASSIUM SERPL-SCNC: 3.8 MMOL/L (ref 3.5–5.1)
PROT SERPL-MCNC: 7.6 G/DL (ref 6.3–8.2)
PROTHROMBIN TIME: 12.8 SEC (ref 12.6–14.5)
RBC # BLD AUTO: 4.43 M/UL (ref 4.05–5.2)
SODIUM SERPL-SCNC: 139 MMOL/L (ref 136–145)
WBC # BLD AUTO: 5.2 K/UL (ref 4.3–11.1)

## 2021-10-10 PROCEDURE — 85610 PROTHROMBIN TIME: CPT

## 2021-10-10 PROCEDURE — 80053 COMPREHEN METABOLIC PANEL: CPT

## 2021-10-10 PROCEDURE — 85025 COMPLETE CBC W/AUTO DIFF WBC: CPT

## 2021-10-10 PROCEDURE — 84703 CHORIONIC GONADOTROPIN ASSAY: CPT

## 2021-10-10 PROCEDURE — 87389 HIV-1 AG W/HIV-1&-2 AB AG IA: CPT

## 2021-10-10 PROCEDURE — 36415 COLL VENOUS BLD VENIPUNCTURE: CPT

## 2021-10-10 PROCEDURE — 85730 THROMBOPLASTIN TIME PARTIAL: CPT

## 2021-10-11 ENCOUNTER — HOSPITAL ENCOUNTER (EMERGENCY)
Age: 21
Discharge: STILL ADMITTED OR ADMINISTRATIVE | End: 2021-10-11

## 2021-10-11 ENCOUNTER — TRANSCRIBE ORDER (OUTPATIENT)
Dept: SCHEDULING | Age: 21
End: 2021-10-11

## 2021-10-11 ENCOUNTER — HOSPITAL ENCOUNTER (OUTPATIENT)
Dept: GENERAL RADIOLOGY | Age: 21
Discharge: HOME | End: 2021-10-11

## 2021-10-11 DIAGNOSIS — Z01.811 PRE-OP CHEST EXAM: ICD-10-CM

## 2021-10-11 DIAGNOSIS — Z01.812 ENCOUNTER FOR PREPROCEDURAL LABORATORY EXAMINATION: Primary | ICD-10-CM

## 2021-10-11 PROCEDURE — 71046 X-RAY EXAM CHEST 2 VIEWS: CPT

## 2021-12-23 ENCOUNTER — HOSPITAL ENCOUNTER (OUTPATIENT)
Dept: GENERAL RADIOLOGY | Age: 21
Discharge: HOME | End: 2021-12-23

## 2021-12-23 DIAGNOSIS — Z01.812 PRE-PROCEDURAL LABORATORY EXAMINATIONS: ICD-10-CM

## 2021-12-23 PROCEDURE — 71046 X-RAY EXAM CHEST 2 VIEWS: CPT

## 2021-12-28 ENCOUNTER — HOSPITAL ENCOUNTER (OUTPATIENT)
Dept: LAB | Age: 21
Discharge: HOME | End: 2021-12-28

## 2021-12-28 LAB
ALBUMIN SERPL-MCNC: 3.9 G/DL (ref 3.5–5)
ALBUMIN/GLOB SERPL: 1.1 {RATIO} (ref 1.2–3.5)
ALP SERPL-CCNC: 80 U/L (ref 50–136)
ALT SERPL-CCNC: 20 U/L (ref 12–65)
ANION GAP SERPL CALC-SCNC: 3 MMOL/L (ref 7–16)
APPEARANCE UR: CLEAR
APTT PPP: 28.5 SEC (ref 24.1–35.1)
AST SERPL-CCNC: 9 U/L (ref 15–37)
BACTERIA URNS QL MICRO: 0 /HPF
BASOPHILS # BLD: 0 K/UL (ref 0–0.2)
BASOPHILS NFR BLD: 0 % (ref 0–2)
BILIRUB SERPL-MCNC: 0.6 MG/DL (ref 0.2–1.1)
BILIRUB UR QL: NEGATIVE
BUN SERPL-MCNC: 8 MG/DL (ref 6–23)
CALCIUM SERPL-MCNC: 9 MG/DL (ref 8.3–10.4)
CASTS URNS QL MICRO: NORMAL /LPF
CHLORIDE SERPL-SCNC: 109 MMOL/L (ref 98–107)
CO2 SERPL-SCNC: 26 MMOL/L (ref 21–32)
COLOR UR: YELLOW
CREAT SERPL-MCNC: 0.59 MG/DL (ref 0.6–1)
CRYSTALS URNS QL MICRO: 0 /LPF
DIFFERENTIAL METHOD BLD: NORMAL
EOSINOPHIL # BLD: 0.1 K/UL (ref 0–0.8)
EOSINOPHIL NFR BLD: 2 % (ref 0.5–7.8)
EPI CELLS #/AREA URNS HPF: NORMAL /HPF
ERYTHROCYTE [DISTWIDTH] IN BLOOD BY AUTOMATED COUNT: 12.8 % (ref 11.9–14.6)
EST. AVERAGE GLUCOSE BLD GHB EST-MCNC: 100 MG/DL
GLOBULIN SER CALC-MCNC: 3.7 G/DL (ref 2.3–3.5)
GLUCOSE SERPL-MCNC: 98 MG/DL (ref 65–100)
GLUCOSE UR STRIP.AUTO-MCNC: NEGATIVE MG/DL
HBA1C MFR BLD: 5.1 % (ref 4.2–6.3)
HCG SERPL QL: NEGATIVE
HCT VFR BLD AUTO: 39.4 % (ref 35.8–46.3)
HGB BLD-MCNC: 13.5 G/DL (ref 11.7–15.4)
HGB UR QL STRIP: NEGATIVE
IMM GRANULOCYTES # BLD AUTO: 0 K/UL (ref 0–0.5)
IMM GRANULOCYTES NFR BLD AUTO: 0 % (ref 0–5)
INR PPP: 1
KETONES UR QL STRIP.AUTO: NEGATIVE MG/DL
LEUKOCYTE ESTERASE UR QL STRIP.AUTO: ABNORMAL
LYMPHOCYTES # BLD: 1.5 K/UL (ref 0.5–4.6)
LYMPHOCYTES NFR BLD: 29 % (ref 13–44)
MCH RBC QN AUTO: 29.4 PG (ref 26.1–32.9)
MCHC RBC AUTO-ENTMCNC: 34.3 G/DL (ref 31.4–35)
MCV RBC AUTO: 85.8 FL (ref 79.6–97.8)
MONOCYTES # BLD: 0.4 K/UL (ref 0.1–1.3)
MONOCYTES NFR BLD: 8 % (ref 4–12)
MUCOUS THREADS URNS QL MICRO: 0 /LPF
NEUTS SEG # BLD: 3.2 K/UL (ref 1.7–8.2)
NEUTS SEG NFR BLD: 61 % (ref 43–78)
NITRITE UR QL STRIP.AUTO: NEGATIVE
NRBC # BLD: 0 K/UL (ref 0–0.2)
PH UR STRIP: 5.5 [PH] (ref 5–9)
PLATELET # BLD AUTO: 188 K/UL (ref 150–450)
PMV BLD AUTO: 12.2 FL (ref 9.4–12.3)
POTASSIUM SERPL-SCNC: 3.7 MMOL/L (ref 3.5–5.1)
PROT SERPL-MCNC: 7.6 G/DL (ref 6.3–8.2)
PROT UR STRIP-MCNC: NEGATIVE MG/DL
PROTHROMBIN TIME: 13.4 SEC (ref 12.6–14.5)
RBC # BLD AUTO: 4.59 M/UL (ref 4.05–5.2)
RBC #/AREA URNS HPF: NORMAL /HPF
SODIUM SERPL-SCNC: 138 MMOL/L (ref 136–145)
SP GR UR REFRACTOMETRY: 1.02 (ref 1–1.02)
UROBILINOGEN UR QL STRIP.AUTO: 0.2 EU/DL (ref 0.2–1)
WBC # BLD AUTO: 5.2 K/UL (ref 4.3–11.1)
WBC URNS QL MICRO: NORMAL /HPF

## 2021-12-28 PROCEDURE — 81015 MICROSCOPIC EXAM OF URINE: CPT

## 2021-12-28 PROCEDURE — 85730 THROMBOPLASTIN TIME PARTIAL: CPT

## 2021-12-28 PROCEDURE — 85025 COMPLETE CBC W/AUTO DIFF WBC: CPT

## 2021-12-28 PROCEDURE — 87389 HIV-1 AG W/HIV-1&-2 AB AG IA: CPT

## 2021-12-28 PROCEDURE — 81003 URINALYSIS AUTO W/O SCOPE: CPT

## 2021-12-28 PROCEDURE — 80053 COMPREHEN METABOLIC PANEL: CPT

## 2021-12-28 PROCEDURE — 36415 COLL VENOUS BLD VENIPUNCTURE: CPT

## 2021-12-28 PROCEDURE — 84703 CHORIONIC GONADOTROPIN ASSAY: CPT

## 2021-12-28 PROCEDURE — 83036 HEMOGLOBIN GLYCOSYLATED A1C: CPT

## 2021-12-28 PROCEDURE — 87086 URINE CULTURE/COLONY COUNT: CPT

## 2021-12-28 PROCEDURE — 85610 PROTHROMBIN TIME: CPT

## 2021-12-29 LAB — HIV 1+2 AB+HIV1 P24 AG SERPL QL IA: NON REACTIVE

## 2021-12-31 LAB
BACTERIA SPEC CULT: NORMAL
SERVICE CMNT-IMP: NORMAL

## 2023-05-23 ENCOUNTER — OFFICE VISIT (OUTPATIENT)
Dept: PRIMARY CARE CLINIC | Facility: CLINIC | Age: 23
End: 2023-05-23

## 2023-05-23 VITALS
OXYGEN SATURATION: 98 % | WEIGHT: 187 LBS | DIASTOLIC BLOOD PRESSURE: 64 MMHG | HEART RATE: 80 BPM | HEIGHT: 65 IN | SYSTOLIC BLOOD PRESSURE: 109 MMHG | BODY MASS INDEX: 31.16 KG/M2 | TEMPERATURE: 97.8 F | RESPIRATION RATE: 17 BRPM

## 2023-05-23 DIAGNOSIS — R21 RASH: Primary | ICD-10-CM

## 2023-05-23 DIAGNOSIS — L92.0 GRANULOMA ANNULARE: ICD-10-CM

## 2023-05-23 DIAGNOSIS — Z01.419 PERIODIC HEALTH ASSESSMENT, PAP AND PELVIC: ICD-10-CM

## 2023-05-23 DIAGNOSIS — Z20.2 POTENTIAL EXPOSURE TO STD: ICD-10-CM

## 2023-05-23 DIAGNOSIS — E66.09 CLASS 1 OBESITY DUE TO EXCESS CALORIES WITHOUT SERIOUS COMORBIDITY WITH BODY MASS INDEX (BMI) OF 30.0 TO 30.9 IN ADULT: ICD-10-CM

## 2023-05-23 PROCEDURE — 99214 OFFICE O/P EST MOD 30 MIN: CPT | Performed by: FAMILY MEDICINE

## 2023-05-23 RX ORDER — CLOTRIMAZOLE AND BETAMETHASONE DIPROPIONATE 10; .64 MG/G; MG/G
CREAM TOPICAL
Qty: 45 G | Refills: 5 | Status: SHIPPED | OUTPATIENT
Start: 2023-05-23

## 2023-05-23 SDOH — ECONOMIC STABILITY: INCOME INSECURITY: HOW HARD IS IT FOR YOU TO PAY FOR THE VERY BASICS LIKE FOOD, HOUSING, MEDICAL CARE, AND HEATING?: NOT VERY HARD

## 2023-05-23 SDOH — ECONOMIC STABILITY: HOUSING INSECURITY
IN THE LAST 12 MONTHS, WAS THERE A TIME WHEN YOU DID NOT HAVE A STEADY PLACE TO SLEEP OR SLEPT IN A SHELTER (INCLUDING NOW)?: NO

## 2023-05-23 SDOH — ECONOMIC STABILITY: FOOD INSECURITY: WITHIN THE PAST 12 MONTHS, THE FOOD YOU BOUGHT JUST DIDN'T LAST AND YOU DIDN'T HAVE MONEY TO GET MORE.: NEVER TRUE

## 2023-05-23 SDOH — ECONOMIC STABILITY: FOOD INSECURITY: WITHIN THE PAST 12 MONTHS, YOU WORRIED THAT YOUR FOOD WOULD RUN OUT BEFORE YOU GOT MONEY TO BUY MORE.: SOMETIMES TRUE

## 2023-05-23 ASSESSMENT — ENCOUNTER SYMPTOMS
SORE THROAT: 0
BLOOD IN STOOL: 0
EYE REDNESS: 0
EYE DISCHARGE: 0
VOMITING: 0
DIARRHEA: 0
CHOKING: 0
SINUS PRESSURE: 0
COUGH: 0
SHORTNESS OF BREATH: 0
PHOTOPHOBIA: 0
COLOR CHANGE: 0
BACK PAIN: 0
CHEST TIGHTNESS: 0
RHINORRHEA: 0
CONSTIPATION: 0
TROUBLE SWALLOWING: 0
VOICE CHANGE: 0
ABDOMINAL PAIN: 0
SINUS PAIN: 0
WHEEZING: 0
NAUSEA: 0
EYE PAIN: 0
ABDOMINAL DISTENTION: 0

## 2023-05-23 ASSESSMENT — PATIENT HEALTH QUESTIONNAIRE - PHQ9
SUM OF ALL RESPONSES TO PHQ QUESTIONS 1-9: 0
SUM OF ALL RESPONSES TO PHQ9 QUESTIONS 1 & 2: 0
SUM OF ALL RESPONSES TO PHQ QUESTIONS 1-9: 0
1. LITTLE INTEREST OR PLEASURE IN DOING THINGS: 0
2. FEELING DOWN, DEPRESSED OR HOPELESS: 0

## 2023-05-23 NOTE — PROGRESS NOTES
Complains of rash or lesions on the lower abdomen genital pubic region for over 2 months. There is a ring like lesions which is spreading from pubic to the lower abdomen. Is mildly itchy. She has not used any medication. They have been there for more than 2 months. She is sexually active has a female partner. She tells me she been  to the female partner for 6 months and herself identifies as male and together they had 3 children she tells me that his wife had 3 children along with her by artificial insemination. She due for Pap smear. Last menses about 3 weeks ago. History of breast reduction surgery. No smoking alcohol or drug abuse. Positive family history significant for cancer however not premature ovarian breast or colon cancer. She denies any other surgeries medical problems or previous lab test normal.  She due for physical    Review of Systems   Constitutional:  Negative for activity change, appetite change, chills, diaphoresis, fatigue, fever and unexpected weight change. HENT:  Negative for congestion, ear pain, hearing loss, nosebleeds, rhinorrhea, sinus pressure, sinus pain, sore throat, trouble swallowing and voice change. Eyes:  Negative for photophobia, pain, discharge, redness and visual disturbance. Respiratory:  Negative for cough, choking, chest tightness, shortness of breath and wheezing. Cardiovascular:  Negative for chest pain, palpitations and leg swelling. Gastrointestinal:  Negative for abdominal distention, abdominal pain, blood in stool, constipation, diarrhea, nausea and vomiting. Endocrine: Negative for polydipsia, polyphagia and polyuria. Genitourinary:  Negative for difficulty urinating, dysuria, frequency, genital sores, hematuria, menstrual problem, pelvic pain, urgency, vaginal bleeding and vaginal discharge. Musculoskeletal:  Negative for arthralgias, back pain, gait problem, joint swelling, myalgias and neck pain. Skin:  Positive for rash.

## 2023-05-26 LAB
C TRACH RRNA SPEC QL NAA+PROBE: NEGATIVE
N GONORRHOEA RRNA SPEC QL NAA+PROBE: NEGATIVE
SPECIMEN SOURCE: NORMAL
T VAGINALIS RRNA SPEC QL NAA+PROBE: NEGATIVE

## 2023-07-31 ENCOUNTER — HOSPITAL ENCOUNTER (EMERGENCY)
Age: 23
Discharge: HOME | End: 2023-07-31
Attending: STUDENT IN AN ORGANIZED HEALTH CARE EDUCATION/TRAINING PROGRAM

## 2023-07-31 VITALS
TEMPERATURE: 99.3 F | BODY MASS INDEX: 30.82 KG/M2 | DIASTOLIC BLOOD PRESSURE: 67 MMHG | HEART RATE: 112 BPM | OXYGEN SATURATION: 98 % | RESPIRATION RATE: 16 BRPM | WEIGHT: 185 LBS | SYSTOLIC BLOOD PRESSURE: 121 MMHG | HEIGHT: 65 IN

## 2023-07-31 DIAGNOSIS — U07.1 COVID-19: Primary | ICD-10-CM

## 2023-07-31 LAB
AMORPH CRY URNS QL MICRO: ABNORMAL
APPEARANCE UR: CLEAR
BACTERIA URNS QL MICRO: ABNORMAL /HPF
BILIRUB UR QL: NEGATIVE
COLOR UR: ABNORMAL
EPI CELLS #/AREA URNS HPF: ABNORMAL /HPF
FLUAV RNA SPEC QL NAA+PROBE: NOT DETECTED
FLUBV RNA SPEC QL NAA+PROBE: NOT DETECTED
GLUCOSE UR STRIP.AUTO-MCNC: NEGATIVE MG/DL
HCG UR QL: NEGATIVE
HGB UR QL STRIP: NEGATIVE
KETONES UR QL STRIP.AUTO: NEGATIVE MG/DL
LEUKOCYTE ESTERASE UR QL STRIP.AUTO: ABNORMAL
NITRITE UR QL STRIP.AUTO: NEGATIVE
PH UR STRIP: 5.5 (ref 5–9)
PROT UR STRIP-MCNC: ABNORMAL MG/DL
RBC #/AREA URNS HPF: ABNORMAL /HPF
SARS-COV-2 RDRP RESP QL NAA+PROBE: DETECTED
SOURCE: ABNORMAL
SP GR UR REFRACTOMETRY: 1.02 (ref 1–1.02)
UROBILINOGEN UR QL STRIP.AUTO: 0.2 EU/DL (ref 0.2–1)
WBC URNS QL MICRO: ABNORMAL /HPF

## 2023-07-31 PROCEDURE — 87502 INFLUENZA DNA AMP PROBE: CPT

## 2023-07-31 PROCEDURE — 87635 SARS-COV-2 COVID-19 AMP PRB: CPT

## 2023-07-31 PROCEDURE — 99283 EMERGENCY DEPT VISIT LOW MDM: CPT

## 2023-07-31 PROCEDURE — 81025 URINE PREGNANCY TEST: CPT

## 2023-07-31 PROCEDURE — 81001 URINALYSIS AUTO W/SCOPE: CPT

## 2023-07-31 ASSESSMENT — PAIN DESCRIPTION - PAIN TYPE: TYPE: ACUTE PAIN

## 2023-07-31 ASSESSMENT — PAIN DESCRIPTION - FREQUENCY: FREQUENCY: CONTINUOUS

## 2023-07-31 ASSESSMENT — PAIN - FUNCTIONAL ASSESSMENT: PAIN_FUNCTIONAL_ASSESSMENT: 0-10

## 2023-07-31 ASSESSMENT — PAIN DESCRIPTION - DESCRIPTORS: DESCRIPTORS: ACHING

## 2023-07-31 ASSESSMENT — PAIN SCALES - GENERAL: PAINLEVEL_OUTOF10: 8

## 2023-07-31 ASSESSMENT — PAIN DESCRIPTION - LOCATION: LOCATION: GENERALIZED

## 2023-07-31 NOTE — ED TRIAGE NOTES
C/o generalized body aches, chills, and back paint hat began this morning Alert-The patient is alert, awake and responds to voice. The patient is oriented to time, place, and person. The triage nurse is able to obtain subjective information.

## 2023-07-31 NOTE — DISCHARGE INSTRUCTIONS
Your COVID swab resulted positive today. This is likely the cause of your symptoms. Continue symptomatic treatment at home and follow-up with your PCP.

## 2023-08-01 ENCOUNTER — CARE COORDINATION (OUTPATIENT)
Dept: CARE COORDINATION | Facility: CLINIC | Age: 23
End: 2023-08-01

## 2023-08-01 NOTE — CARE COORDINATION
Patient contacted regarding COVID-19 diagnosis. Discussed COVID-19 related testing which was available at this time. Test results were positive. Patient informed of results, if available? Patient aware. LPN Care Coordinator contacted the patient by telephone to perform post discharge assessment. Call within 2 business days of discharge: Yes. Verified name and  with patient as identifiers. Provided introduction to self, and explanation of the CTN/ACM role, and reason for call due to risk factors for infection and/or exposure to COVID-19. Symptoms reviewed with patient who verbalized the following symptoms: fatigue  cough. Due to no new or worsening symptoms encounter was not routed to provider for escalation. Discussed follow-up appointments. If no appointment was previously scheduled, appointment scheduling offered: Patient states she will contact her PCP if needed. [facility] follow up appointment(s): No future appointments. Non-[facility] follow up appointment(s): n/a    Non-face-to-face services provided:  Obtained and reviewed discharge summary and/or continuity of care documents  Education of patient/family/caregiver/guardian to support self-management-covid symptoms     Advance Care Planning:   Does patient have an Advance Directive:  decision maker updated. Educated patient about risk for severe COVID-19 due to risk factors according to CDC guidelines. LPN CC reviewed discharge instructions, medical action plan and red flag symptoms with the patient who verbalized understanding. Discussed COVID vaccination status: Yes. Education provided on COVID-19 vaccination as appropriate. Discussed exposure protocols and quarantine with CDC Guidelines. Patient was given an opportunity to verbalize any questions and concerns and agrees to contact LPN CC or health care provider for questions related to their healthcare.     Reviewed and educated patient on any new and changed medications related to discharge

## 2023-08-08 ENCOUNTER — CARE COORDINATION (OUTPATIENT)
Dept: CARE COORDINATION | Facility: CLINIC | Age: 23
End: 2023-08-08

## 2023-08-08 NOTE — CARE COORDINATION
Date/Time:  8/8/2023 9:05 AM  Attempted to reach patient by telephone for second subsequent covid concerns outreach. Call within 2 business days of discharge: Yes Left HIPPA compliant message requesting a return call.

## 2024-01-22 ENCOUNTER — HOSPITAL ENCOUNTER (EMERGENCY)
Age: 24
Discharge: HOME | End: 2024-01-22
Attending: EMERGENCY MEDICINE

## 2024-01-22 VITALS
OXYGEN SATURATION: 98 % | BODY MASS INDEX: 29.99 KG/M2 | WEIGHT: 180 LBS | DIASTOLIC BLOOD PRESSURE: 97 MMHG | HEART RATE: 81 BPM | RESPIRATION RATE: 17 BRPM | SYSTOLIC BLOOD PRESSURE: 131 MMHG | HEIGHT: 65 IN | TEMPERATURE: 98.2 F

## 2024-01-22 DIAGNOSIS — B00.1 HERPES LABIALIS: Primary | ICD-10-CM

## 2024-01-22 PROCEDURE — 99283 EMERGENCY DEPT VISIT LOW MDM: CPT

## 2024-01-22 RX ORDER — ACYCLOVIR 400 MG/1
400 TABLET ORAL 3 TIMES DAILY
Qty: 15 TABLET | Refills: 0 | Status: SHIPPED | OUTPATIENT
Start: 2024-01-22 | End: 2024-01-27

## 2024-01-22 ASSESSMENT — PAIN - FUNCTIONAL ASSESSMENT: PAIN_FUNCTIONAL_ASSESSMENT: NONE - DENIES PAIN

## 2024-01-22 NOTE — ED TRIAGE NOTES
Pt ambulatory to triage. Pt reports blisters on her lips that she noticed yesterday. Pt states she has had cold sores in the past and uses abbreva. Pt denies performing oral sex

## 2024-01-22 NOTE — ED PROVIDER NOTES
Emergency Department Provider Note       PCP: None, None   Age: 23 y.o.   Sex: female     DISPOSITION Decision To Discharge 01/22/2024 04:17:00 PM       ICD-10-CM    1. Herpes labialis  B00.1           Medical Decision Making     Complexity of Problems Addressed:  Complexity of Problem: 1 acute, uncomplicated illness or injury.    Data Reviewed and Analyzed:  I independently ordered and reviewed each unique test.             Discussion of management or test interpretation.  Patient seems to have a herpes simplex type I outbreak to her lips.  Will prescribe her oral acyclovir.         Risk of Complications and/or Morbidity of Patient Management:  Prescription drug management performed and Shared medical decision making was utilized in creating the patients health plan today.    History      Patient is coming in after having some bumps on her lower lip.  They started yesterday.  She does have a history of this recurring about once a year.  She first got them about 10 years ago.  She is not having any fever or other symptoms.  She has not been on any medications or creams.    The history is provided by the patient.        Physical Exam     Vitals signs and nursing note reviewed.   Vitals:    01/22/24 1556   BP: (!) 131/97   Pulse: 81   Resp: 17   Temp: 98.2 °F (36.8 °C)   TempSrc: Oral   SpO2: 98%   Weight: 81.6 kg (180 lb)   Height: 1.651 m (5' 5\")       Physical Exam  Vitals and nursing note reviewed.   Constitutional:       General: She is not in acute distress.     Appearance: Normal appearance. She is not ill-appearing, toxic-appearing or diaphoretic.   HENT:      Head: Atraumatic.      Mouth/Throat:      Comments: Lower lip has several erythematous lesions and small vesicles.  Area involved is in the center of the lip.  No oral involvement  Eyes:      General: No scleral icterus.  Pulmonary:      Effort: Pulmonary effort is normal. No respiratory distress.      Breath sounds: No stridor.   Abdominal:

## 2024-09-07 ENCOUNTER — APPOINTMENT (OUTPATIENT)
Dept: GENERAL RADIOLOGY | Age: 24
End: 2024-09-07

## 2024-09-07 ENCOUNTER — HOSPITAL ENCOUNTER (EMERGENCY)
Age: 24
Discharge: HOME | End: 2024-09-07
Attending: EMERGENCY MEDICINE

## 2024-09-07 VITALS
OXYGEN SATURATION: 99 % | TEMPERATURE: 98.1 F | HEIGHT: 65 IN | SYSTOLIC BLOOD PRESSURE: 104 MMHG | BODY MASS INDEX: 27.66 KG/M2 | RESPIRATION RATE: 15 BRPM | WEIGHT: 166 LBS | DIASTOLIC BLOOD PRESSURE: 65 MMHG | HEART RATE: 78 BPM

## 2024-09-07 DIAGNOSIS — K59.00 CONSTIPATION, UNSPECIFIED CONSTIPATION TYPE: Primary | ICD-10-CM

## 2024-09-07 LAB
ALBUMIN SERPL-MCNC: 4.2 G/DL (ref 3.5–5)
ALBUMIN/GLOB SERPL: 1.4 (ref 1–1.9)
ALP SERPL-CCNC: 58 U/L (ref 35–104)
ALT SERPL-CCNC: 46 U/L (ref 12–65)
ANION GAP SERPL CALC-SCNC: 12 MMOL/L (ref 9–18)
AST SERPL-CCNC: 95 U/L (ref 15–37)
BACTERIA URNS QL MICRO: 0 /HPF
BASOPHILS # BLD: 0 K/UL (ref 0–0.2)
BASOPHILS NFR BLD: 0 % (ref 0–2)
BILIRUB SERPL-MCNC: 0.7 MG/DL (ref 0–1.2)
BILIRUB UR QL: NEGATIVE
BUN SERPL-MCNC: 11 MG/DL (ref 6–23)
CALCIUM SERPL-MCNC: 9.3 MG/DL (ref 8.8–10.2)
CASTS URNS QL MICRO: 0 /LPF
CHLORIDE SERPL-SCNC: 102 MMOL/L (ref 98–107)
CO2 SERPL-SCNC: 24 MMOL/L (ref 20–28)
CREAT SERPL-MCNC: 0.7 MG/DL (ref 0.6–1.1)
CRYSTALS URNS QL MICRO: 0 /LPF
DIFFERENTIAL METHOD BLD: NORMAL
EOSINOPHIL # BLD: 0.1 K/UL (ref 0–0.8)
EOSINOPHIL NFR BLD: 1 % (ref 0.5–7.8)
EPI CELLS #/AREA URNS HPF: NORMAL /HPF
ERYTHROCYTE [DISTWIDTH] IN BLOOD BY AUTOMATED COUNT: 12.5 % (ref 11.9–14.6)
GLOBULIN SER CALC-MCNC: 3 G/DL (ref 2.3–3.5)
GLUCOSE SERPL-MCNC: 99 MG/DL (ref 70–99)
GLUCOSE UR QL STRIP.AUTO: NEGATIVE MG/DL
HCG UR QL: NEGATIVE
HCT VFR BLD AUTO: 38.9 % (ref 35.8–46.3)
HGB BLD-MCNC: 13.4 G/DL (ref 11.7–15.4)
IMM GRANULOCYTES # BLD AUTO: 0 K/UL (ref 0–0.5)
IMM GRANULOCYTES NFR BLD AUTO: 0 % (ref 0–5)
KETONES UR-MCNC: NEGATIVE MG/DL
LEUKOCYTE ESTERASE UR QL STRIP: ABNORMAL
LIPASE SERPL-CCNC: 18 U/L (ref 13–60)
LYMPHOCYTES # BLD: 1.9 K/UL (ref 0.5–4.6)
LYMPHOCYTES NFR BLD: 23 % (ref 13–44)
MCH RBC QN AUTO: 30.7 PG (ref 26.1–32.9)
MCHC RBC AUTO-ENTMCNC: 34.4 G/DL (ref 31.4–35)
MCV RBC AUTO: 89 FL (ref 82–102)
MONOCYTES # BLD: 0.5 K/UL (ref 0.1–1.3)
MONOCYTES NFR BLD: 7 % (ref 4–12)
MUCOUS THREADS URNS QL MICRO: 0 /LPF
NEUTS SEG # BLD: 5.5 K/UL (ref 1.7–8.2)
NEUTS SEG NFR BLD: 68 % (ref 43–78)
NITRITE UR QL: NEGATIVE
NRBC # BLD: 0 K/UL (ref 0–0.2)
PH UR: 6 (ref 5–9)
PLATELET # BLD AUTO: 183 K/UL (ref 150–450)
PMV BLD AUTO: 11.6 FL (ref 9.4–12.3)
POTASSIUM SERPL-SCNC: 4.2 MMOL/L (ref 3.5–5.1)
PROT SERPL-MCNC: 7.2 G/DL (ref 6.3–8.2)
PROT UR QL: NEGATIVE MG/DL
RBC # BLD AUTO: 4.37 M/UL (ref 4.05–5.2)
RBC # UR STRIP: NEGATIVE
RBC #/AREA URNS HPF: NORMAL /HPF
SERVICE CMNT-IMP: ABNORMAL
SODIUM SERPL-SCNC: 138 MMOL/L (ref 136–145)
SP GR UR: 1.01 (ref 1–1.02)
UROBILINOGEN UR QL: 0.2 EU/DL (ref 0.2–1)
WBC # BLD AUTO: 8.1 K/UL (ref 4.3–11.1)
WBC URNS QL MICRO: NORMAL /HPF

## 2024-09-07 PROCEDURE — 85025 COMPLETE CBC W/AUTO DIFF WBC: CPT

## 2024-09-07 PROCEDURE — 83690 ASSAY OF LIPASE: CPT

## 2024-09-07 PROCEDURE — 6360000002 HC RX W HCPCS: Performed by: EMERGENCY MEDICINE

## 2024-09-07 PROCEDURE — 6370000000 HC RX 637 (ALT 250 FOR IP): Performed by: EMERGENCY MEDICINE

## 2024-09-07 PROCEDURE — 81025 URINE PREGNANCY TEST: CPT

## 2024-09-07 PROCEDURE — 96374 THER/PROPH/DIAG INJ IV PUSH: CPT

## 2024-09-07 PROCEDURE — 96375 TX/PRO/DX INJ NEW DRUG ADDON: CPT

## 2024-09-07 PROCEDURE — 80053 COMPREHEN METABOLIC PANEL: CPT

## 2024-09-07 PROCEDURE — 81001 URINALYSIS AUTO W/SCOPE: CPT

## 2024-09-07 PROCEDURE — 74022 RADEX COMPL AQT ABD SERIES: CPT

## 2024-09-07 PROCEDURE — 2580000003 HC RX 258: Performed by: EMERGENCY MEDICINE

## 2024-09-07 PROCEDURE — 99284 EMERGENCY DEPT VISIT MOD MDM: CPT

## 2024-09-07 RX ORDER — ONDANSETRON 2 MG/ML
4 INJECTION INTRAMUSCULAR; INTRAVENOUS
Status: COMPLETED | OUTPATIENT
Start: 2024-09-07 | End: 2024-09-07

## 2024-09-07 RX ORDER — KETOROLAC TROMETHAMINE 30 MG/ML
30 INJECTION, SOLUTION INTRAMUSCULAR; INTRAVENOUS
Status: COMPLETED | OUTPATIENT
Start: 2024-09-07 | End: 2024-09-07

## 2024-09-07 RX ORDER — MAGNESIUM HYDROXIDE/ALUMINUM HYDROXICE/SIMETHICONE 120; 1200; 1200 MG/30ML; MG/30ML; MG/30ML
30 SUSPENSION ORAL
Status: COMPLETED | OUTPATIENT
Start: 2024-09-07 | End: 2024-09-07

## 2024-09-07 RX ORDER — 0.9 % SODIUM CHLORIDE 0.9 %
1000 INTRAVENOUS SOLUTION INTRAVENOUS
Status: COMPLETED | OUTPATIENT
Start: 2024-09-07 | End: 2024-09-07

## 2024-09-07 RX ORDER — LIDOCAINE HYDROCHLORIDE 20 MG/ML
15 SOLUTION OROPHARYNGEAL
Status: COMPLETED | OUTPATIENT
Start: 2024-09-07 | End: 2024-09-07

## 2024-09-07 RX ORDER — POLYETHYLENE GLYCOL 3350 17 G/17G
17 POWDER, FOR SOLUTION ORAL 2 TIMES DAILY
Qty: 225 G | Refills: 0 | Status: SHIPPED | OUTPATIENT
Start: 2024-09-07 | End: 2024-09-10

## 2024-09-07 RX ADMIN — ONDANSETRON 4 MG: 2 INJECTION INTRAMUSCULAR; INTRAVENOUS at 08:47

## 2024-09-07 RX ADMIN — ALUMINUM HYDROXIDE, MAGNESIUM HYDROXIDE, DIMETHICONE 30 ML: 200; 200; 20 LIQUID ORAL at 08:47

## 2024-09-07 RX ADMIN — SODIUM CHLORIDE 1000 ML: 9 INJECTION, SOLUTION INTRAVENOUS at 08:48

## 2024-09-07 RX ADMIN — KETOROLAC TROMETHAMINE 30 MG: 30 INJECTION, SOLUTION INTRAMUSCULAR at 09:56

## 2024-09-07 RX ADMIN — LIDOCAINE HYDROCHLORIDE 15 ML: 20 SOLUTION ORAL at 08:47

## 2024-09-07 ASSESSMENT — PAIN - FUNCTIONAL ASSESSMENT: PAIN_FUNCTIONAL_ASSESSMENT: 0-10

## 2024-09-07 ASSESSMENT — LIFESTYLE VARIABLES
HOW MANY STANDARD DRINKS CONTAINING ALCOHOL DO YOU HAVE ON A TYPICAL DAY: 1 OR 2
HOW OFTEN DO YOU HAVE A DRINK CONTAINING ALCOHOL: 2-4 TIMES A MONTH

## 2024-09-07 ASSESSMENT — PAIN DESCRIPTION - ORIENTATION: ORIENTATION: MID

## 2024-09-07 ASSESSMENT — PAIN SCALES - GENERAL
PAINLEVEL_OUTOF10: 7
PAINLEVEL_OUTOF10: 7

## 2024-09-07 ASSESSMENT — ENCOUNTER SYMPTOMS
CONSTIPATION: 1
NAUSEA: 1
SHORTNESS OF BREATH: 0
COLOR CHANGE: 0
DIARRHEA: 0
COUGH: 0
BACK PAIN: 0
VOMITING: 1
ABDOMINAL PAIN: 1

## 2024-09-07 ASSESSMENT — PAIN DESCRIPTION - LOCATION: LOCATION: ABDOMEN

## 2024-09-07 NOTE — ED PROVIDER NOTES
Emergency Department Provider Note       PCP: None, None   Age: 23 y.o.   Sex: female     DISPOSITION Decision To Discharge 09/07/2024 10:52:57 AM  Condition at Disposition: Stable       ICD-10-CM    1. Constipation, unspecified constipation type  K59.00           Medical Decision Making     Patient with upper abdominal pain.  Has constipation on imaging.  No acute on lab work or urinalysis.  Will discharge with MiraLAX at home.     1 or more acute illnesses that pose a threat to life or bodily function.   Prescription drug management performed.  Patient was discharged risks and benefits of hospitalization were considered.  Shared medical decision making was utilized in creating the patients health plan today.  I independently ordered and reviewed each unique test.           I interpreted the X-rays no free air.              History     Patient had some nausea and vomiting on Monday.  Started with epigastric pain sharp in nature last night.  Improved and then returned again this morning.  No nausea or vomiting today.  No diarrhea.  Some constipation.  Last BM 4 days ago.  Here for evaluation.    The history is provided by the patient. No  was used.   Abdominal Pain  Pain location:  Epigastric  Pain quality: aching and sharp    Pain radiates to:  Does not radiate  Pain severity:  Moderate  Duration:  2 days  Timing:  Intermittent  Progression:  Worsening  Chronicity:  New  Relieved by:  Nothing  Worsened by:  Nothing  Associated symptoms: constipation, nausea and vomiting    Associated symptoms: no chest pain, no chills, no cough, no diarrhea, no dysuria, no fatigue, no fever, no hematuria and no shortness of breath    Emesis  Associated symptoms include abdominal pain. Pertinent negatives include no chest pain, no headaches and no shortness of breath.       ROS     Review of Systems   Constitutional:  Negative for chills, fatigue and fever.   Respiratory:  Negative for cough and shortness of    constipation      Electronically signed by Lena Dixon                   No results for input(s): \"COVID19\" in the last 72 hours.    Voice dictation software was used during the making of this note.  This software is not perfect and grammatical and other typographical errors may be present.  This note has not been completely proofread for errors.     Sandor Zimmer III, MD  09/07/24 2478

## 2024-09-07 NOTE — ED TRIAGE NOTES
Pt CO n/v since Monday, sharp intermittent pain since Wednesday and constipation. Last BM x4 days ago. Pt states she had a fever on Tuesday but has not had one since.

## 2024-09-07 NOTE — ED NOTES
Patient mobility status  with no difficulty. Provider aware     I have reviewed discharge instructions with the patient.  The patient verbalized understanding.    Patient left ED via Discharge Method: ambulatory to Home with  self and significant other .    Opportunity for questions and clarification provided.     Patient given 1 script.

## 2025-01-03 SDOH — HEALTH STABILITY: PHYSICAL HEALTH: ON AVERAGE, HOW MANY DAYS PER WEEK DO YOU ENGAGE IN MODERATE TO STRENUOUS EXERCISE (LIKE A BRISK WALK)?: 3 DAYS

## 2025-01-03 SDOH — HEALTH STABILITY: PHYSICAL HEALTH: ON AVERAGE, HOW MANY MINUTES DO YOU ENGAGE IN EXERCISE AT THIS LEVEL?: 60 MIN

## 2025-01-06 ENCOUNTER — OFFICE VISIT (OUTPATIENT)
Dept: INTERNAL MEDICINE CLINIC | Facility: CLINIC | Age: 25
End: 2025-01-06

## 2025-01-06 VITALS
BODY MASS INDEX: 29.59 KG/M2 | TEMPERATURE: 98.6 F | SYSTOLIC BLOOD PRESSURE: 115 MMHG | HEIGHT: 65 IN | DIASTOLIC BLOOD PRESSURE: 69 MMHG | OXYGEN SATURATION: 98 % | WEIGHT: 177.6 LBS | HEART RATE: 98 BPM

## 2025-01-06 DIAGNOSIS — R79.89 ELEVATED LFTS: ICD-10-CM

## 2025-01-06 DIAGNOSIS — Z12.4 CERVICAL CANCER SCREENING: ICD-10-CM

## 2025-01-06 DIAGNOSIS — Z11.59 NEED FOR HEPATITIS C SCREENING TEST: ICD-10-CM

## 2025-01-06 DIAGNOSIS — R73.9 HYPERGLYCEMIA: ICD-10-CM

## 2025-01-06 DIAGNOSIS — F64.9 GENDER DYSPHORIA: ICD-10-CM

## 2025-01-06 DIAGNOSIS — Z13.220 SCREENING FOR HYPERLIPIDEMIA: ICD-10-CM

## 2025-01-06 DIAGNOSIS — J45.20 MILD INTERMITTENT ASTHMA WITHOUT COMPLICATION: Primary | ICD-10-CM

## 2025-01-06 LAB
ALBUMIN SERPL-MCNC: 3.9 G/DL (ref 3.5–5)
ALBUMIN/GLOB SERPL: 1.2 (ref 1–1.9)
ALP SERPL-CCNC: 55 U/L (ref 35–104)
ALT SERPL-CCNC: 16 U/L (ref 8–45)
ANION GAP SERPL CALC-SCNC: 10 MMOL/L (ref 7–16)
AST SERPL-CCNC: 18 U/L (ref 15–37)
BASOPHILS # BLD: 0 K/UL (ref 0–0.2)
BASOPHILS NFR BLD: 1 % (ref 0–2)
BILIRUB DIRECT SERPL-MCNC: <0.2 MG/DL (ref 0–0.4)
BILIRUB SERPL-MCNC: 0.5 MG/DL (ref 0–1.2)
BUN SERPL-MCNC: 15 MG/DL (ref 6–23)
CALCIUM SERPL-MCNC: 9.3 MG/DL (ref 8.8–10.2)
CHLORIDE SERPL-SCNC: 106 MMOL/L (ref 98–107)
CHOLEST SERPL-MCNC: 213 MG/DL (ref 0–200)
CO2 SERPL-SCNC: 25 MMOL/L (ref 20–29)
CREAT SERPL-MCNC: 0.76 MG/DL (ref 0.6–1.1)
DIFFERENTIAL METHOD BLD: NORMAL
EOSINOPHIL # BLD: 0.1 K/UL (ref 0–0.8)
EOSINOPHIL NFR BLD: 2 % (ref 0.5–7.8)
ERYTHROCYTE [DISTWIDTH] IN BLOOD BY AUTOMATED COUNT: 12.9 % (ref 11.9–14.6)
GLOBULIN SER CALC-MCNC: 3.3 G/DL (ref 2.3–3.5)
GLUCOSE SERPL-MCNC: 84 MG/DL (ref 70–99)
HCT VFR BLD AUTO: 41.4 % (ref 35.8–46.3)
HDLC SERPL-MCNC: 71 MG/DL (ref 40–60)
HDLC SERPL: 3 (ref 0–5)
HGB BLD-MCNC: 13.5 G/DL (ref 11.7–15.4)
IMM GRANULOCYTES # BLD AUTO: 0 K/UL (ref 0–0.5)
IMM GRANULOCYTES NFR BLD AUTO: 0 % (ref 0–5)
LDLC SERPL CALC-MCNC: 122 MG/DL (ref 0–100)
LYMPHOCYTES # BLD: 1.7 K/UL (ref 0.5–4.6)
LYMPHOCYTES NFR BLD: 29 % (ref 13–44)
MCH RBC QN AUTO: 30 PG (ref 26.1–32.9)
MCHC RBC AUTO-ENTMCNC: 32.6 G/DL (ref 31.4–35)
MCV RBC AUTO: 92 FL (ref 82–102)
MONOCYTES # BLD: 0.5 K/UL (ref 0.1–1.3)
MONOCYTES NFR BLD: 8 % (ref 4–12)
NEUTS SEG # BLD: 3.6 K/UL (ref 1.7–8.2)
NEUTS SEG NFR BLD: 60 % (ref 43–78)
NRBC # BLD: 0 K/UL (ref 0–0.2)
PLATELET # BLD AUTO: 204 K/UL (ref 150–450)
PMV BLD AUTO: 12.3 FL (ref 9.4–12.3)
POTASSIUM SERPL-SCNC: 4.4 MMOL/L (ref 3.5–5.1)
PROT SERPL-MCNC: 7.2 G/DL (ref 6.3–8.2)
RBC # BLD AUTO: 4.5 M/UL (ref 4.05–5.2)
SODIUM SERPL-SCNC: 140 MMOL/L (ref 136–145)
TRIGL SERPL-MCNC: 99 MG/DL (ref 0–150)
VLDLC SERPL CALC-MCNC: 20 MG/DL (ref 6–23)
WBC # BLD AUTO: 5.9 K/UL (ref 4.3–11.1)

## 2025-01-06 PROCEDURE — 99214 OFFICE O/P EST MOD 30 MIN: CPT | Performed by: INTERNAL MEDICINE

## 2025-01-06 RX ORDER — ALBUTEROL SULFATE 90 UG/1
2 INHALANT RESPIRATORY (INHALATION) EVERY 4 HOURS PRN
Qty: 18 G | Refills: 5 | Status: SHIPPED | OUTPATIENT
Start: 2025-01-06

## 2025-01-06 SDOH — ECONOMIC STABILITY: FOOD INSECURITY: WITHIN THE PAST 12 MONTHS, YOU WORRIED THAT YOUR FOOD WOULD RUN OUT BEFORE YOU GOT MONEY TO BUY MORE.: NEVER TRUE

## 2025-01-06 SDOH — ECONOMIC STABILITY: FOOD INSECURITY: WITHIN THE PAST 12 MONTHS, THE FOOD YOU BOUGHT JUST DIDN'T LAST AND YOU DIDN'T HAVE MONEY TO GET MORE.: NEVER TRUE

## 2025-01-06 SDOH — ECONOMIC STABILITY: INCOME INSECURITY: HOW HARD IS IT FOR YOU TO PAY FOR THE VERY BASICS LIKE FOOD, HOUSING, MEDICAL CARE, AND HEATING?: NOT HARD AT ALL

## 2025-01-06 ASSESSMENT — PATIENT HEALTH QUESTIONNAIRE - PHQ9
SUM OF ALL RESPONSES TO PHQ QUESTIONS 1-9: 0
SUM OF ALL RESPONSES TO PHQ QUESTIONS 1-9: 0
1. LITTLE INTEREST OR PLEASURE IN DOING THINGS: NOT AT ALL
SUM OF ALL RESPONSES TO PHQ QUESTIONS 1-9: 0
2. FEELING DOWN, DEPRESSED OR HOPELESS: NOT AT ALL
SUM OF ALL RESPONSES TO PHQ9 QUESTIONS 1 & 2: 0
SUM OF ALL RESPONSES TO PHQ QUESTIONS 1-9: 0

## 2025-01-06 NOTE — PROGRESS NOTES
Alcon Pinedo M.D.  Internal Medicine  [address] [address]  Phone: [phone]  Fax: [phone]    Asthma  This is a chronic problem. Her past medical history is significant for asthma.      Elisabeth Chandler is a 24 y.o..     Current Outpatient Medications   Medication Sig Dispense Refill    albuterol sulfate HFA (PROVENTIL;VENTOLIN;PROAIR) 108 (90 Base) MCG/ACT inhaler Inhale 2 puffs into the lungs every 4 hours as needed for Wheezing 18 g 5     No current facility-administered medications for this visit.     No Known Allergies    Past Medical History:   Diagnosis Date    Asthma     Trauma      Past Surgical History:   Procedure Laterality Date    BREAST REDUCTION SURGERY       Social History     Tobacco Use    Smoking status: Never    Smokeless tobacco: Never   Vaping Use    Vaping status: Never Used   Substance Use Topics    Alcohol use: No    Drug use: No     Family History   Problem Relation Age of Onset    Cervical Cancer Mother     Diabetes Paternal Grandfather     Cancer Paternal Aunt         Unknown.    Coronary Art Dis Neg Hx     Heart Attack Neg Hx     Colon Cancer Neg Hx     Thyroid Cancer Neg Hx       Review of Systems     Physical Exam  Vitals and nursing note reviewed.   Constitutional:       General: She is not in acute distress.     Appearance: Normal appearance. She is not ill-appearing, toxic-appearing or diaphoretic.   HENT:      Head: Normocephalic and atraumatic.   Cardiovascular:      Rate and Rhythm: Normal rate and regular rhythm.      Heart sounds: Normal heart sounds. No murmur heard.     No friction rub. No gallop.   Pulmonary:      Effort: Pulmonary effort is normal. No respiratory distress.      Breath sounds: Normal breath sounds. No stridor. No wheezing, rhonchi or rales.   Abdominal:      General: Abdomen is flat. Bowel sounds are normal.      Palpations: Abdomen is soft.   Musculoskeletal:      Right lower leg: No edema.

## 2025-01-06 NOTE — ACP (ADVANCE CARE PLANNING)
Advance Care Planning   The patient has the following advanced directives on file:  Advance Directives       Power of  Living Will ACP-Advance Directive ACP-Power of     Not on File Not on File Not on File Not on File            The patient has appointed the following active healthcare agents:    Primary Decision Maker: Rosangela Ng - Denisha - [phone]    The Patient has the following current code status:    Code Status: Not on file    Visit Documentation:  I discussed Advance Care Planning with Elisabeth Chandler today which included the importance of making their choices for care and treatment in the case of a health event that adversely affects their decision-making abilities. She has not completed the Advance Care Directives. She does not have an active health care agent at this time.  Elisabeth Chandler was encouraged to complete the declaration forms and provide a signed copy of her medical records.  I advised patient we would continue this discussion at future visits.     Calvin Astudillo  1/6/2025

## 2025-01-07 ENCOUNTER — TELEPHONE (OUTPATIENT)
Dept: INTERNAL MEDICINE CLINIC | Facility: CLINIC | Age: 25
End: 2025-01-07

## 2025-01-07 DIAGNOSIS — F64.9 GENDER DYSPHORIA: Primary | ICD-10-CM

## 2025-01-07 LAB
HCV AB SERPL QL IA: NORMAL
HCV IGG SERPL QL IA: NON REACTIVE S/CO RATIO

## 2025-01-07 NOTE — TELEPHONE ENCOUNTER
Referral Coordinator Anna Ziegler sent me a message requesting a new referral be placed to Dr. Merry Montoya. New referral placed. Ty

## 2025-07-14 ENCOUNTER — APPOINTMENT (OUTPATIENT)
Dept: GENERAL RADIOLOGY | Age: 25
End: 2025-07-14

## 2025-07-14 ENCOUNTER — HOSPITAL ENCOUNTER (EMERGENCY)
Age: 25
Discharge: HOME | End: 2025-07-14

## 2025-07-14 VITALS
DIASTOLIC BLOOD PRESSURE: 84 MMHG | SYSTOLIC BLOOD PRESSURE: 126 MMHG | HEIGHT: 65 IN | BODY MASS INDEX: 31.65 KG/M2 | WEIGHT: 190 LBS | HEART RATE: 71 BPM | TEMPERATURE: 98.4 F | OXYGEN SATURATION: 98 % | RESPIRATION RATE: 16 BRPM

## 2025-07-14 DIAGNOSIS — S60.221A CONTUSION OF RIGHT HAND, INITIAL ENCOUNTER: Primary | ICD-10-CM

## 2025-07-14 PROCEDURE — 73130 X-RAY EXAM OF HAND: CPT

## 2025-07-14 PROCEDURE — 99283 EMERGENCY DEPT VISIT LOW MDM: CPT

## 2025-07-14 ASSESSMENT — PAIN SCALES - GENERAL
PAINLEVEL_OUTOF10: 6
PAINLEVEL_OUTOF10: 6

## 2025-07-14 ASSESSMENT — PAIN - FUNCTIONAL ASSESSMENT: PAIN_FUNCTIONAL_ASSESSMENT: 0-10

## 2025-07-15 NOTE — ED PROVIDER NOTES
Emergency Department Provider Note       PCP: Alcon Pinedo MD   Age: 24 y.o.   Sex: female     DISPOSITION Decision To Discharge 07/14/2025 09:46:40 PM                ICD-10-CM    1. Contusion of right hand, initial encounter  S60.221A           Medical Decision Making     24-year-old female states she fell onto her right hand this morning still has pain this evening x-rays were negative for any fracture she does have a contusion to the hand.  Patient placed in a Velcro splint to help with protection while at work.  She is to use Tylenol Motrin for pain ice daily see primary care as needed     1 acute, uncomplicated illness or injury.  Shared medical decision making was utilized in creating the patients health plan today.    I independently ordered and reviewed each unique test.       I interpreted the X-rays right hand x-rays negative for any obvious fracture.              History     Pt rt handed fell onto hand this am, still has right hand pain this evening.  She denies any other injury she has been using some ibuprofen with minimal relief          ROS     Review of Systems   All other systems reviewed and are negative.       Physical Exam     Vitals signs and nursing note reviewed:  Vitals:    07/14/25 1919 07/14/25 2145   BP: 111/77 126/84   Pulse: 81 71   Resp: 14 16   Temp: 98.4 °F (36.9 °C)    TempSrc: Oral    SpO2: 98% 98%   Weight: 86.2 kg (190 lb)    Height: 1.651 m (5' 5\")       Physical Exam  Vitals and nursing note reviewed.   Constitutional:       Appearance: Normal appearance. She is normal weight.   HENT:      Head: Normocephalic and atraumatic.      Right Ear: External ear normal.      Left Ear: External ear normal.      Nose: Nose normal.      Mouth/Throat:      Mouth: Mucous membranes are moist.      Pharynx: Oropharynx is clear.   Eyes:      Extraocular Movements: Extraocular movements intact.      Pupils: Pupils are equal, round, and reactive to light.   Cardiovascular:      Rate and

## 2025-07-15 NOTE — DISCHARGE INSTRUCTIONS
Wear brace for comfort, ice the hand twice a day alternate Tylenol Motrin for pain see your primary care physician as needed for recheck